# Patient Record
Sex: FEMALE | Race: WHITE | URBAN - METROPOLITAN AREA
[De-identification: names, ages, dates, MRNs, and addresses within clinical notes are randomized per-mention and may not be internally consistent; named-entity substitution may affect disease eponyms.]

---

## 2018-05-04 ENCOUNTER — HOSPITAL ENCOUNTER (OUTPATIENT)
Age: 83
Discharge: HOME | End: 2018-05-06
Payer: MEDICARE

## 2018-05-04 ENCOUNTER — OFFICE VISIT (OUTPATIENT)
Dept: SURGERY | Age: 83
End: 2018-05-04
Payer: MEDICARE

## 2018-05-04 DIAGNOSIS — L98.9 SKIN LESION: Primary | ICD-10-CM

## 2018-05-04 PROCEDURE — 11100 PR OFFICE/OUTPT VISIT,PROCEDURE ONLY: CPT | Performed by: PHYSICIAN ASSISTANT

## 2018-05-04 PROCEDURE — 99204 OFFICE O/P NEW MOD 45 MIN: CPT | Performed by: PHYSICIAN ASSISTANT

## 2018-05-04 PROCEDURE — 88305 TISSUE EXAM BY PATHOLOGIST: CPT

## 2018-05-04 RX ORDER — LIDOCAINE HYDROCHLORIDE AND EPINEPHRINE 10; 10 MG/ML; UG/ML
3 INJECTION, SOLUTION INFILTRATION; PERINEURAL ONCE
Status: COMPLETED | OUTPATIENT
Start: 2018-05-04 | End: 2018-05-04

## 2018-05-04 RX ADMIN — LIDOCAINE HYDROCHLORIDE AND EPINEPHRINE 3 ML: 10; 10 INJECTION, SOLUTION INFILTRATION; PERINEURAL at 15:08

## 2024-01-08 ENCOUNTER — APPOINTMENT (OUTPATIENT)
Dept: GENERAL RADIOLOGY | Age: 89
End: 2024-01-08
Payer: MEDICARE

## 2024-01-08 ENCOUNTER — APPOINTMENT (OUTPATIENT)
Dept: CT IMAGING | Age: 89
End: 2024-01-08
Payer: MEDICARE

## 2024-01-08 ENCOUNTER — HOSPITAL ENCOUNTER (INPATIENT)
Age: 89
LOS: 4 days | Discharge: OTHER INSTITUTION | End: 2024-01-12
Attending: EMERGENCY MEDICINE | Admitting: INTERNAL MEDICINE
Payer: MEDICARE

## 2024-01-08 DIAGNOSIS — B34.2 CORONAVIRUS INFECTION, UNSPECIFIED: ICD-10-CM

## 2024-01-08 DIAGNOSIS — I71.21 ANEURYSM OF ASCENDING AORTA WITHOUT RUPTURE (HCC): ICD-10-CM

## 2024-01-08 DIAGNOSIS — R53.1 GENERALIZED WEAKNESS: Primary | ICD-10-CM

## 2024-01-08 DIAGNOSIS — J18.9 PNEUMONIA OF BOTH LOWER LOBES DUE TO INFECTIOUS ORGANISM: ICD-10-CM

## 2024-01-08 PROBLEM — R53.83 LETHARGIC: Status: ACTIVE | Noted: 2024-01-08

## 2024-01-08 PROBLEM — J00 ACUTE NASOPHARYNGITIS: Status: ACTIVE | Noted: 2024-01-08

## 2024-01-08 PROBLEM — I31.39 PERICARDIAL EFFUSION: Status: ACTIVE | Noted: 2024-01-08

## 2024-01-08 LAB
ALBUMIN SERPL-MCNC: 3.4 G/DL (ref 3.5–5.2)
ALP SERPL-CCNC: 122 U/L (ref 35–104)
ALT SERPL-CCNC: 53 U/L (ref 0–32)
ANION GAP SERPL CALCULATED.3IONS-SCNC: 16 MMOL/L (ref 7–16)
AST SERPL-CCNC: 51 U/L (ref 0–31)
B PARAP IS1001 DNA NPH QL NAA+NON-PROBE: NOT DETECTED
B PERT DNA SPEC QL NAA+PROBE: NOT DETECTED
BACTERIA URNS QL MICRO: ABNORMAL
BASOPHILS # BLD: 0.03 K/UL (ref 0–0.2)
BASOPHILS NFR BLD: 0 % (ref 0–2)
BILIRUB SERPL-MCNC: 0.7 MG/DL (ref 0–1.2)
BILIRUB UR QL STRIP: NEGATIVE
BNP SERPL-MCNC: 3164 PG/ML (ref 0–450)
BUN SERPL-MCNC: 29 MG/DL (ref 6–23)
C PNEUM DNA NPH QL NAA+NON-PROBE: NOT DETECTED
CALCIUM SERPL-MCNC: 9.4 MG/DL (ref 8.6–10.2)
CHLORIDE SERPL-SCNC: 93 MMOL/L (ref 98–107)
CLARITY UR: CLEAR
CO2 SERPL-SCNC: 20 MMOL/L (ref 22–29)
COLOR UR: YELLOW
CREAT SERPL-MCNC: 1 MG/DL (ref 0.5–1)
EOSINOPHIL # BLD: 0 K/UL (ref 0.05–0.5)
EOSINOPHILS RELATIVE PERCENT: 0 % (ref 0–6)
ERYTHROCYTE [DISTWIDTH] IN BLOOD BY AUTOMATED COUNT: 12.5 % (ref 11.5–15)
FLUAV RNA NPH QL NAA+NON-PROBE: NOT DETECTED
FLUBV RNA NPH QL NAA+NON-PROBE: NOT DETECTED
GFR SERPL CREATININE-BSD FRML MDRD: 55 ML/MIN/1.73M2
GLUCOSE SERPL-MCNC: 232 MG/DL (ref 74–99)
GLUCOSE UR STRIP-MCNC: 100 MG/DL
HADV DNA NPH QL NAA+NON-PROBE: NOT DETECTED
HCOV 229E RNA NPH QL NAA+NON-PROBE: NOT DETECTED
HCOV HKU1 RNA NPH QL NAA+NON-PROBE: NOT DETECTED
HCOV NL63 RNA NPH QL NAA+NON-PROBE: NOT DETECTED
HCOV OC43 RNA NPH QL NAA+NON-PROBE: DETECTED
HCT VFR BLD AUTO: 34 % (ref 34–48)
HGB BLD-MCNC: 11.4 G/DL (ref 11.5–15.5)
HGB UR QL STRIP.AUTO: ABNORMAL
HMPV RNA NPH QL NAA+NON-PROBE: NOT DETECTED
HPIV1 RNA NPH QL NAA+NON-PROBE: NOT DETECTED
HPIV2 RNA NPH QL NAA+NON-PROBE: NOT DETECTED
HPIV3 RNA NPH QL NAA+NON-PROBE: NOT DETECTED
HPIV4 RNA NPH QL NAA+NON-PROBE: NOT DETECTED
IMM GRANULOCYTES # BLD AUTO: 0.2 K/UL (ref 0–0.58)
IMM GRANULOCYTES NFR BLD: 1 % (ref 0–5)
INFLUENZA A BY PCR: NOT DETECTED
INFLUENZA B BY PCR: NOT DETECTED
KETONES UR STRIP-MCNC: ABNORMAL MG/DL
LACTATE BLDV-SCNC: 2.7 MMOL/L (ref 0.5–1.9)
LEUKOCYTE ESTERASE UR QL STRIP: NEGATIVE
LIPASE SERPL-CCNC: 41 U/L (ref 13–60)
LYMPHOCYTES NFR BLD: 3.19 K/UL (ref 1.5–4)
LYMPHOCYTES RELATIVE PERCENT: 19 % (ref 20–42)
M PNEUMO DNA NPH QL NAA+NON-PROBE: NOT DETECTED
MCH RBC QN AUTO: 31.3 PG (ref 26–35)
MCHC RBC AUTO-ENTMCNC: 33.5 G/DL (ref 32–34.5)
MCV RBC AUTO: 93.4 FL (ref 80–99.9)
MONOCYTES NFR BLD: 1.04 K/UL (ref 0.1–0.95)
MONOCYTES NFR BLD: 6 % (ref 2–12)
NEUTROPHILS NFR BLD: 74 % (ref 43–80)
NEUTS SEG NFR BLD: 12.47 K/UL (ref 1.8–7.3)
NITRITE UR QL STRIP: NEGATIVE
PH UR STRIP: 5.5 [PH] (ref 5–9)
PLATELET # BLD AUTO: 207 K/UL (ref 130–450)
PMV BLD AUTO: 9.2 FL (ref 7–12)
POTASSIUM SERPL-SCNC: 3.9 MMOL/L (ref 3.5–5)
PROT SERPL-MCNC: 6.9 G/DL (ref 6.4–8.3)
PROT UR STRIP-MCNC: 100 MG/DL
RBC # BLD AUTO: 3.64 M/UL (ref 3.5–5.5)
RBC # BLD: ABNORMAL 10*6/UL
RBC #/AREA URNS HPF: ABNORMAL /HPF
RSV BY PCR: NOT DETECTED
RSV RNA NPH QL NAA+NON-PROBE: NOT DETECTED
RV+EV RNA NPH QL NAA+NON-PROBE: NOT DETECTED
SARS-COV-2 RDRP RESP QL NAA+PROBE: NOT DETECTED
SARS-COV-2 RNA NPH QL NAA+NON-PROBE: NOT DETECTED
SODIUM SERPL-SCNC: 129 MMOL/L (ref 132–146)
SP GR UR STRIP: 1.02 (ref 1–1.03)
SPECIMEN DESCRIPTION: ABNORMAL
SPECIMEN DESCRIPTION: NORMAL
SPECIMEN SOURCE: NORMAL
TROPONIN I SERPL HS-MCNC: 103 NG/L (ref 0–9)
TROPONIN I SERPL HS-MCNC: 94 NG/L (ref 0–9)
UROBILINOGEN UR STRIP-ACNC: 1 EU/DL (ref 0–1)
WBC #/AREA URNS HPF: ABNORMAL /HPF
WBC OTHER # BLD: 16.9 K/UL (ref 4.5–11.5)

## 2024-01-08 PROCEDURE — 6360000002 HC RX W HCPCS: Performed by: EMERGENCY MEDICINE

## 2024-01-08 PROCEDURE — 87634 RSV DNA/RNA AMP PROBE: CPT

## 2024-01-08 PROCEDURE — 2580000003 HC RX 258: Performed by: EMERGENCY MEDICINE

## 2024-01-08 PROCEDURE — 99223 1ST HOSP IP/OBS HIGH 75: CPT | Performed by: INTERNAL MEDICINE

## 2024-01-08 PROCEDURE — 72170 X-RAY EXAM OF PELVIS: CPT

## 2024-01-08 PROCEDURE — 0202U NFCT DS 22 TRGT SARS-COV-2: CPT

## 2024-01-08 PROCEDURE — 87154 CUL TYP ID BLD PTHGN 6+ TRGT: CPT

## 2024-01-08 PROCEDURE — 2500000003 HC RX 250 WO HCPCS: Performed by: EMERGENCY MEDICINE

## 2024-01-08 PROCEDURE — 83605 ASSAY OF LACTIC ACID: CPT

## 2024-01-08 PROCEDURE — 83880 ASSAY OF NATRIURETIC PEPTIDE: CPT

## 2024-01-08 PROCEDURE — 81001 URINALYSIS AUTO W/SCOPE: CPT

## 2024-01-08 PROCEDURE — 99285 EMERGENCY DEPT VISIT HI MDM: CPT

## 2024-01-08 PROCEDURE — 74174 CTA ABD&PLVS W/CONTRAST: CPT

## 2024-01-08 PROCEDURE — 87040 BLOOD CULTURE FOR BACTERIA: CPT

## 2024-01-08 PROCEDURE — 71045 X-RAY EXAM CHEST 1 VIEW: CPT

## 2024-01-08 PROCEDURE — 84484 ASSAY OF TROPONIN QUANT: CPT

## 2024-01-08 PROCEDURE — 71250 CT THORAX DX C-: CPT

## 2024-01-08 PROCEDURE — 72125 CT NECK SPINE W/O DYE: CPT

## 2024-01-08 PROCEDURE — 96365 THER/PROPH/DIAG IV INF INIT: CPT

## 2024-01-08 PROCEDURE — 93005 ELECTROCARDIOGRAM TRACING: CPT | Performed by: PHYSICIAN ASSISTANT

## 2024-01-08 PROCEDURE — 83690 ASSAY OF LIPASE: CPT

## 2024-01-08 PROCEDURE — 2580000003 HC RX 258

## 2024-01-08 PROCEDURE — 85025 COMPLETE CBC W/AUTO DIFF WBC: CPT

## 2024-01-08 PROCEDURE — 96374 THER/PROPH/DIAG INJ IV PUSH: CPT

## 2024-01-08 PROCEDURE — 6360000004 HC RX CONTRAST MEDICATION: Performed by: RADIOLOGY

## 2024-01-08 PROCEDURE — 87635 SARS-COV-2 COVID-19 AMP PRB: CPT

## 2024-01-08 PROCEDURE — 70450 CT HEAD/BRAIN W/O DYE: CPT

## 2024-01-08 PROCEDURE — 71275 CT ANGIOGRAPHY CHEST: CPT

## 2024-01-08 PROCEDURE — 87502 INFLUENZA DNA AMP PROBE: CPT

## 2024-01-08 PROCEDURE — 87086 URINE CULTURE/COLONY COUNT: CPT

## 2024-01-08 PROCEDURE — 80053 COMPREHEN METABOLIC PANEL: CPT

## 2024-01-08 PROCEDURE — 6370000000 HC RX 637 (ALT 250 FOR IP): Performed by: EMERGENCY MEDICINE

## 2024-01-08 PROCEDURE — 2060000000 HC ICU INTERMEDIATE R&B

## 2024-01-08 RX ORDER — 0.9 % SODIUM CHLORIDE 0.9 %
500 INTRAVENOUS SOLUTION INTRAVENOUS ONCE
Status: COMPLETED | OUTPATIENT
Start: 2024-01-09 | End: 2024-01-09

## 2024-01-08 RX ORDER — ACETAMINOPHEN 500 MG
500 TABLET ORAL EVERY 6 HOURS PRN
Status: DISCONTINUED | OUTPATIENT
Start: 2024-01-08 | End: 2024-01-11 | Stop reason: ALTCHOICE

## 2024-01-08 RX ORDER — ACETAMINOPHEN 650 MG/1
650 SUPPOSITORY RECTAL ONCE
Status: COMPLETED | OUTPATIENT
Start: 2024-01-08 | End: 2024-01-08

## 2024-01-08 RX ORDER — 0.9 % SODIUM CHLORIDE 0.9 %
500 INTRAVENOUS SOLUTION INTRAVENOUS ONCE
Status: COMPLETED | OUTPATIENT
Start: 2024-01-08 | End: 2024-01-08

## 2024-01-08 RX ORDER — ACETAMINOPHEN 325 MG/1
650 TABLET ORAL ONCE
Status: DISCONTINUED | OUTPATIENT
Start: 2024-01-08 | End: 2024-01-08

## 2024-01-08 RX ORDER — ACETAMINOPHEN 650 MG/1
SUPPOSITORY RECTAL
Status: DISPENSED
Start: 2024-01-08 | End: 2024-01-09

## 2024-01-08 RX ORDER — SODIUM CHLORIDE 0.9 % (FLUSH) 0.9 %
SYRINGE (ML) INJECTION
Status: COMPLETED
Start: 2024-01-08 | End: 2024-01-08

## 2024-01-08 RX ORDER — 0.9 % SODIUM CHLORIDE 0.9 %
1000 INTRAVENOUS SOLUTION INTRAVENOUS ONCE
Status: COMPLETED | OUTPATIENT
Start: 2024-01-08 | End: 2024-01-08

## 2024-01-08 RX ADMIN — SODIUM CHLORIDE 500 ML: 9 INJECTION, SOLUTION INTRAVENOUS at 18:26

## 2024-01-08 RX ADMIN — SODIUM CHLORIDE 1000 ML: 9 INJECTION, SOLUTION INTRAVENOUS at 13:01

## 2024-01-08 RX ADMIN — SODIUM CHLORIDE, PRESERVATIVE FREE 10 ML: 5 INJECTION INTRAVENOUS at 13:01

## 2024-01-08 RX ADMIN — DOXYCYCLINE 100 MG: 100 INJECTION, POWDER, LYOPHILIZED, FOR SOLUTION INTRAVENOUS at 15:14

## 2024-01-08 RX ADMIN — WATER 1000 MG: 1 INJECTION INTRAMUSCULAR; INTRAVENOUS; SUBCUTANEOUS at 14:57

## 2024-01-08 RX ADMIN — IOPAMIDOL 75 ML: 755 INJECTION, SOLUTION INTRAVENOUS at 16:18

## 2024-01-08 RX ADMIN — ACETAMINOPHEN 650 MG: 650 SUPPOSITORY RECTAL at 14:57

## 2024-01-08 ASSESSMENT — PAIN SCALES - GENERAL
PAINLEVEL_OUTOF10: 7
PAINLEVEL_OUTOF10: 5

## 2024-01-08 ASSESSMENT — LIFESTYLE VARIABLES
HOW OFTEN DO YOU HAVE A DRINK CONTAINING ALCOHOL: NEVER
HOW MANY STANDARD DRINKS CONTAINING ALCOHOL DO YOU HAVE ON A TYPICAL DAY: PATIENT DOES NOT DRINK

## 2024-01-08 ASSESSMENT — PAIN - FUNCTIONAL ASSESSMENT: PAIN_FUNCTIONAL_ASSESSMENT: NONE - DENIES PAIN

## 2024-01-08 NOTE — ED TRIAGE NOTES
Department of Emergency Medicine    FIRST PROVIDER TRIAGE NOTE             Independent MLP           1/8/24  10:50 AM EST    Date of Encounter: 1/8/24   MRN: [id number]    Vitals:    01/08/24 1104   BP: 120/67   Pulse: (!) 124   Resp: 21   Temp: 100.4 °F (38 °C)   TempSrc: Oral   SpO2: 96%   Weight: 54.4 kg (120 lb)   Height: 1.524 m (5')      HPI: Rosa Isela Weaver is a 89 y.o. female who presents to the ED for Altered Mental Status (Began today upon waking. ) and Fatigue (Pretty fatigued for the past couple days. Normally very active, lives alone. )     ROS: Negative for urinary complaints.    Physical Exam:   Gen Appearance/Constitutional: alert, lethargic   CV: tachycardia     Initial Plan of Care: All treatment areas with department are currently occupied.     Plan to order/Initiate the following while awaiting opening in ED: Triage evaluation  EKG and imaging studies.    Initial Plan of Care: Initiate Treatment-Testing, Proceed toTreatment Area When Bed Available for ED Attending/MLP to Continue Care    Electronically signed by Sakina Vazquez PA-C   DD: 1/8/24

## 2024-01-08 NOTE — PROGRESS NOTES
Database initiated. Patient is alert to self only. (Usually AX4) comes in from home alone. She uses a walker and is RA at baseline. She was unable to ambulate today. Hard of hearing even with hearing aides.

## 2024-01-08 NOTE — ED PROVIDER NOTES
[facility] EMERGENCY DEPARTMENT  EMERGENCY DEPARTMENT ENCOUNTER        Pt Name: Rosa Isela Weaver  MRN: [id number]  Birthdate 12/16/1933  Date of evaluation: 1/8/2024  Provider: Kimberly Dover MD  PCP: Alber Leong MD  Note Started: 11:25 AM EST 1/8/24    CHIEF COMPLAINT       Chief Complaint   Patient presents with    Altered Mental Status     Began today upon waking.     Fatigue     Pretty fatigued for the past couple days. Normally very active, lives alone.        HISTORY OF PRESENT ILLNESS: 1 or more Elements   History From:  daughter    Limitations to history : Mild confusion and very hard of hearing without working hearing aids    Rosa Isela Weaver is a 89 y.o. female who presents to department with her daughter.  Daughter is staying with her for the past few days as patient been feeling more weak generalized fatigue.  No known fever at home but low-grade fever here.  No cough or congestion no vomiting no diarrhea.  She usually walks with a is been now been using a walker.  She did fall getting out of bed yesterday not on any blood thinners.  Unknown if she hit her head.  Daughter thought she seemed slightly more confused today and too weak to get out of bed.  Patient lives alone but daughter has been staying with her. Her hearing aids are not working so she is extremely hard of hearing.  She states that she has no pain at this time she is pleasant no acute distress.  Alert and oriented to person.  Difficult to determine if she is confused regarding place and time or just extremely hard of hearing.  No distress.    Nursing Notes were all reviewed and agreed with or any disagreements were addressed in the HPI.      REVIEW OF EXTERNAL NOTE :       PDMP reviewed    REVIEW OF SYSTEMS :           Positives and Pertinent negatives as per HPI.     SURGICAL HISTORY   No past surgical history on file.    CURRENTMEDICATIONS       Previous Medications    No medications on

## 2024-01-09 LAB
EKG ATRIAL RATE: 111 BPM
EKG P AXIS: 17 DEGREES
EKG P-R INTERVAL: 174 MS
EKG Q-T INTERVAL: 334 MS
EKG QRS DURATION: 114 MS
EKG QTC CALCULATION (BAZETT): 454 MS
EKG R AXIS: -39 DEGREES
EKG T AXIS: 21 DEGREES
EKG VENTRICULAR RATE: 111 BPM
ERYTHROCYTE [SEDIMENTATION RATE] IN BLOOD BY WESTERGREN METHOD: 90 MM/HR (ref 0–20)
LACTATE BLDV-SCNC: 2.4 MMOL/L (ref 0.5–2.2)
MICROORGANISM SPEC CULT: NO GROWTH
SPECIMEN DESCRIPTION: NORMAL

## 2024-01-09 PROCEDURE — 2500000003 HC RX 250 WO HCPCS: Performed by: INTERNAL MEDICINE

## 2024-01-09 PROCEDURE — 83605 ASSAY OF LACTIC ACID: CPT

## 2024-01-09 PROCEDURE — 83036 HEMOGLOBIN GLYCOSYLATED A1C: CPT

## 2024-01-09 PROCEDURE — 99232 SBSQ HOSP IP/OBS MODERATE 35: CPT | Performed by: INTERNAL MEDICINE

## 2024-01-09 PROCEDURE — 6360000002 HC RX W HCPCS: Performed by: INTERNAL MEDICINE

## 2024-01-09 PROCEDURE — 93010 ELECTROCARDIOGRAM REPORT: CPT | Performed by: INTERNAL MEDICINE

## 2024-01-09 PROCEDURE — 6360000002 HC RX W HCPCS: Performed by: SPECIALIST

## 2024-01-09 PROCEDURE — 51798 US URINE CAPACITY MEASURE: CPT

## 2024-01-09 PROCEDURE — 87899 AGENT NOS ASSAY W/OPTIC: CPT

## 2024-01-09 PROCEDURE — 6370000000 HC RX 637 (ALT 250 FOR IP): Performed by: INTERNAL MEDICINE

## 2024-01-09 PROCEDURE — 85652 RBC SED RATE AUTOMATED: CPT

## 2024-01-09 PROCEDURE — 2060000000 HC ICU INTERMEDIATE R&B

## 2024-01-09 PROCEDURE — 36415 COLL VENOUS BLD VENIPUNCTURE: CPT

## 2024-01-09 PROCEDURE — 2580000003 HC RX 258: Performed by: SPECIALIST

## 2024-01-09 PROCEDURE — 87040 BLOOD CULTURE FOR BACTERIA: CPT

## 2024-01-09 PROCEDURE — 2580000003 HC RX 258: Performed by: INTERNAL MEDICINE

## 2024-01-09 RX ORDER — ONDANSETRON 2 MG/ML
4 INJECTION INTRAMUSCULAR; INTRAVENOUS EVERY 6 HOURS PRN
Status: DISCONTINUED | OUTPATIENT
Start: 2024-01-09 | End: 2024-01-12 | Stop reason: HOSPADM

## 2024-01-09 RX ORDER — ACETAMINOPHEN 325 MG/1
650 TABLET ORAL EVERY 6 HOURS PRN
Status: DISCONTINUED | OUTPATIENT
Start: 2024-01-09 | End: 2024-01-12 | Stop reason: HOSPADM

## 2024-01-09 RX ORDER — ASCORBIC ACID 1000 MG
1 TABLET ORAL EVERY MORNING
COMMUNITY

## 2024-01-09 RX ORDER — ENOXAPARIN SODIUM 100 MG/ML
30 INJECTION SUBCUTANEOUS DAILY
Status: DISCONTINUED | OUTPATIENT
Start: 2024-01-09 | End: 2024-01-12

## 2024-01-09 RX ORDER — ONDANSETRON 4 MG/1
4 TABLET, ORALLY DISINTEGRATING ORAL EVERY 8 HOURS PRN
Status: DISCONTINUED | OUTPATIENT
Start: 2024-01-09 | End: 2024-01-12 | Stop reason: HOSPADM

## 2024-01-09 RX ORDER — SODIUM CHLORIDE 0.9 % (FLUSH) 0.9 %
5-40 SYRINGE (ML) INJECTION EVERY 12 HOURS SCHEDULED
Status: DISCONTINUED | OUTPATIENT
Start: 2024-01-09 | End: 2024-01-12 | Stop reason: HOSPADM

## 2024-01-09 RX ORDER — 0.9 % SODIUM CHLORIDE 0.9 %
500 INTRAVENOUS SOLUTION INTRAVENOUS ONCE
Status: COMPLETED | OUTPATIENT
Start: 2024-01-09 | End: 2024-01-09

## 2024-01-09 RX ORDER — POLYETHYLENE GLYCOL 3350 17 G/17G
17 POWDER, FOR SOLUTION ORAL DAILY PRN
Status: DISCONTINUED | OUTPATIENT
Start: 2024-01-09 | End: 2024-01-12 | Stop reason: HOSPADM

## 2024-01-09 RX ORDER — SODIUM CHLORIDE 0.9 % (FLUSH) 0.9 %
5-40 SYRINGE (ML) INJECTION PRN
Status: DISCONTINUED | OUTPATIENT
Start: 2024-01-09 | End: 2024-01-12 | Stop reason: HOSPADM

## 2024-01-09 RX ORDER — SODIUM CHLORIDE 9 MG/ML
INJECTION, SOLUTION INTRAVENOUS PRN
Status: DISCONTINUED | OUTPATIENT
Start: 2024-01-09 | End: 2024-01-12 | Stop reason: HOSPADM

## 2024-01-09 RX ORDER — ACETAMINOPHEN 650 MG/1
650 SUPPOSITORY RECTAL EVERY 6 HOURS PRN
Status: DISCONTINUED | OUTPATIENT
Start: 2024-01-09 | End: 2024-01-12 | Stop reason: HOSPADM

## 2024-01-09 RX ADMIN — SODIUM CHLORIDE 500 ML: 9 INJECTION, SOLUTION INTRAVENOUS at 08:10

## 2024-01-09 RX ADMIN — WATER 2000 MG: 1 INJECTION INTRAMUSCULAR; INTRAVENOUS; SUBCUTANEOUS at 11:36

## 2024-01-09 RX ADMIN — ENOXAPARIN SODIUM 30 MG: 100 INJECTION SUBCUTANEOUS at 17:20

## 2024-01-09 RX ADMIN — DOXYCYCLINE 100 MG: 100 INJECTION, POWDER, LYOPHILIZED, FOR SOLUTION INTRAVENOUS at 03:21

## 2024-01-09 RX ADMIN — ACETAMINOPHEN 650 MG: 650 SUPPOSITORY RECTAL at 21:19

## 2024-01-09 RX ADMIN — SODIUM CHLORIDE 500 ML: 9 INJECTION, SOLUTION INTRAVENOUS at 11:48

## 2024-01-09 RX ADMIN — Medication 10 ML: at 21:02

## 2024-01-09 NOTE — CONSULTS
[facility]  [facility]  Consultation Note     Admit Date: 1/8/2024 11:15 AM    Reason for Consult:   Positive blood cultures    Attending Physician:  Neda Jack MD    HISTORY OF PRESENT ILLNESS:             The history is obtained from extensive review of available past medical records and daughters. The patient is a 89 y.o. female who is unknown to the ID service.  The patient was brought into the ED at [facility] on 1/8/2023 with fatigue and altered mentation.  She had a fall and out of bed trying to get out of bed the day before.  No known trauma.  On presentation she had a low-grade fever and later had a Tmax of 102.5 °F.  She was tachycardic and tachypneic.  White count was 16.9.  Transaminases were elevated with an AST of 51 and ALT of 53.  Alkaline phosphatase was 122.  BUN was slightly elevated and creatinine was normal.  Lactic acid was 2.7.  CT angiogram showed small bilateral lower lobe infiltrates.  CT of the abdomen and pelvis was unremarkable.  Respiratory panel was positive for coronavirus OC 43.  Rapid SARS-CoV-2, rapid influenza and RSV were negative.  Blood cultures have turned positive for Streptococcus pneumoniae in 4 bottles.  She was treated with Ceftriaxone and Doxycycline.  Daughters say that she hardly ever went to see doctors and in general has declined vaccinations.    Past Medical History:    No past medical history on file.  Past Surgical History:    No past surgical history on file.  Current Medications:   Scheduled Meds:   cefTRIAXone (ROCEPHIN) IV  2,000 mg IntraVENous Once     Continuous Infusions:  PRN Meds:acetaminophen    Allergies:  Bactrim [sulfamethoxazole-trimethoprim]    Social History:   Social History     Socioeconomic History    Marital status:      Social Determinants of Health     Food Insecurity: No Food Insecurity (1/8/2024)    Hunger Vital Sign     Worried About Running Out of Food in the Last Year: Never true     Ran  Out of Food in the Last Year: Never true   Transportation Needs: No Transportation Needs (1/8/2024)    PRAPARE - Transportation     Lack of Transportation (Medical): No     Lack of Transportation (Non-Medical): No   Housing Stability: Low Risk  (1/8/2024)    Housing Stability Vital Sign     Unable to Pay for Housing in the Last Year: No     Number of Places Lived in the Last Year: 1     Unstable Housing in the Last Year: No      Pets: None  Travel: No  The patient lives alone.  She did work in a chocolate factory    Family History:   No family history on file.. Otherwise non-pertinent to the chief complaint.    REVIEW OF SYSTEMS:    A 10 point review of systems could not be obtained from the patient given her status of confusion.  Otherwise, as in the HPI    PHYSICAL EXAM:    Vitals:   BP (!) 149/90   Pulse 81   Temp 97.8 °F (36.6 °C) (Oral)   Resp 22   Ht 1.524 m (5')   Wt 54.4 kg (120 lb)   SpO2 96%   BMI 23.44 kg/m²   Constitutional: The patient is lying on a gurney in the ED.  She is lethargic.  She is arousable but barely answers questions or follows commands.  She is hard of hearing.  2 daughters in room.  Skin: Warm and dry. No rashes were noted.   HEENT: Eyes show round, and reactive pupils. No jaundice. Moist mucous membranes, no ulcerations, no thrush.   Neck: Supple to movements. No lymphadenopathy.   Chest: No use of accessory muscles to breathe. Symmetrical expansion. Auscultation reveals no wheezing, crackles, or rhonchi.   Cardiovascular: S1 and S2 are rhythmic and regular. No murmurs appreciated.   Abdomen: Positive bowel sounds to auscultation. Benign to palpation. No masses felt. No hepatosplenomegaly.  Extremities: No clubbing, no cyanosis.  Minimal edema.  Lines: Peripheral.    No results found for: \"CRP\"   No results found for: \"CRPHS\"  No results found for: \"SEDRATE\"    Radiology:      Microbiology:  Respiratory panel: Coronavirus OC43  Blood cultures 1/8/2024: Streptococcus pneumoniae

## 2024-01-09 NOTE — CONSULTS
Marco Antonio Ramsey M.D.,[facility]  Conor Joel D.O., LÓPEZ., [facility]  Adriane Sue M.D.  Porsha Buckley M.D.   Enmanuel Major D.O.      Patient:  Rosa Isela Weaver 89 y.o. female MRN: [id number]     Date of Service: 1/9/2024      PULMONARY CONSULTATION    Reason for Consultation:   Referring Physician: Strep pneumonia positive corona/not COVID    Communication with the referring physician will be sent via the electronic medical record.    Chief Complaint: Altered mental status, fatigue    CODE STATUS: Full code    SUBJECTIVE:  HPI:  Rosa Isela Weaver is a 89 y.o. female not previously known to us with no significant past medical history who was brought to the hospital by her daughter for weakness, fatigue and lethargy ongoing for 3 to 4 days.  At baseline the patient is very active and lives alone.  Workup revealed that she is positive for the coronavirus OC 43 and she is also bacteremic with streptococcal pneumonia.  She also has leukocytosis with a white count of 16.9.    Rosa Isela was seen today with her daughter present at the bedside.  Rosa Isela is normally very very hard of hearing even with hearing aids in therefore it is unclear if she is currently confused or if she just cannot hear anything therefore she appears confused.  Lungs are clear on exam.  She is pretty restless and moaning.      No past medical history on file.    No past surgical history on file.    No family history on file.    Social History:   Social History     Socioeconomic History    Marital status:      Spouse name: Not on file    Number of children: Not on file    Years of education: Not on file    Highest education level: Not on file   Occupational History    Not on file   Tobacco Use    Smoking status: Not on file    Smokeless tobacco: Not on file   Substance and Sexual Activity    Alcohol use: Not on file    Drug use: Not on file    Sexual activity: Not on file   Other Topics Concern    Not on file   Social History Narrative

## 2024-01-09 NOTE — PROGRESS NOTES
Full nurse to nurse report called to [facility] for room 20 going to room 408. All questions all concerns addressed.

## 2024-01-09 NOTE — PROGRESS NOTES
Call to Dr Jack, reported blood cultures positive result and reported labs from 1/8/24 upon draw per chart.Reviewed with family and updated.

## 2024-01-09 NOTE — PLAN OF CARE
Working on fu notes from last night  I see strep pneumonia +blood  Likely true +strep after   +corona virus/not covid infection    As putting in order for rocephin I see Dr Nicole also putting in order  His says 2grams ?loading-  I put in maintenance Q`24 hours  ?1-2grams IVQ24 hours-     I see now per his note ID noted  Now 2grams daily rocephin    Doxycycline stopped  Will defer abx to ID  Repeat blood cx ordered  I also ordered urine antigen strep  Pulm consult?-

## 2024-01-09 NOTE — PROGRESS NOTES
Consulted to collect labs/blood cultures.  Was able to collect blood in blood cultures but very minimal amount as pt has poor access.

## 2024-01-09 NOTE — PROGRESS NOTES
[facility] Hospitalist Progress Note    Admitting Date and Time: 1/8/2024 11:15 AM  Admit Dx: Generalized weakness [R53.1]  Coronavirus infection [B34.2]  Pneumonia of both lower lobes due to infectious organism [J18.9]  Coronavirus infection, unspecified [B34.2]  Aneurysm of ascending aorta without rupture (HCC) [I71.21]    Subjective:  Patient is being followed for Generalized weakness [R53.1]  Coronavirus infection [B34.2]  Pneumonia of both lower lobes due to infectious organism [J18.9]  Coronavirus infection, unspecified [B34.2]  Aneurysm of ascending aorta without rupture (HCC) [I71.21]   Pt seen and examined this morning  No acute events overnight  Sitting up in bed in no acute distress  Very hard of hearing unable to complete ROS    ROS: Unable to assess     [START ON 1/10/2024] cefTRIAXone (ROCEPHIN) IV  2,000 mg IntraVENous Q24H     acetaminophen, 500 mg, Q6H PRN         Objective:    /82   Pulse (!) 101   Temp 98.7 °F (37.1 °C) (Oral)   Resp 22   Ht 1.524 m (5')   Wt 54.4 kg (120 lb)   SpO2 95%   BMI 23.44 kg/m²     General Appearance: alert and awake, in no acute distress  Skin: warm and dry  Head: normocephalic and atraumatic  Eyes: pupils equal, round, and reactive to light, extraocular eye movements intact, conjunctivae normal  Neck: neck supple and non tender without mass   Pulmonary/Chest: clear to auscultation bilaterally- no wheezes, rales or rhonchi, normal air movement, no respiratory distress  Cardiovascular: normal rate, normal S1 and S2 and no carotid bruits  Abdomen: soft, non-tender, non-distended, normal bowel sounds, no masses or organomegaly  Extremities: no cyanosis, no clubbing and no edema  Neurologic: Unable to assess        Recent Labs     01/08/24  1234   *   K 3.9   CL 93*   CO2 20*   BUN 29*   CREATININE 1.0   GLUCOSE 232*   CALCIUM 9.4       Recent Labs     01/08/24  1234   WBC 16.9*   RBC 3.64   HGB 11.4*   HCT 34.0   MCV 93.4   MCH 31.3   MCHC 33.5    RDW 12.5      MPV 9.2       Assessment and Plan:     Principal Problem:    Coronavirus infection  Active Problems:    Acute nasopharyngitis    Pericardial effusion    Lethargic  Resolved Problems:    * No resolved hospital problems. *    Coronavirus infection with superimposed bacterial pneumonia.  Strep pneumo.  Continue Rocephin.  Supportive care, breathing treatments, oxygen as needed.  Strep pneumo bacteremia.  Likely 2/2 the above.  ID following.  Continue Rocephin 2 g.  Repeat blood cultures  Acute metabolic encephalopathy.  Likely 2/2 the above.  Continue antibiotics and monitor mentation.  Patient is very hard of hearing and hearing aids do not work so unable to fully assess mentation/orientation  Lactic acidosis.  Likely 2/2 the above.  Received 2 L IVF in the ED.  Follow repeat  Generalized weakness and fatigue.  Likely 2/2 the above.  Continue supportive care.  PT OT  Leukocytosis.  Continue antibiotics.  Monitor CBC  Elevated proBNP.  3,164.  CXR with vascular congestion.  Diuresis after lactic acidosis is resolved and hemodynamically stable. ?  Underlying CHF.    Will need to discuss CODE STATUS    Time spent reviewing chart, clinical exam, discussing case and answering questions with staff/consultants/patient/family aprox 35 mins.     NOTE: This report was transcribed using voice recognition software. Every effort was made to ensure accuracy; however, inadvertent computerized transcription errors may be present.  Electronically signed by Neda Jack MD on 1/9/2024 at 3:11 PM

## 2024-01-09 NOTE — H&P
[facility] Hospitalist Group History and Physical        Chief Complaint:  pneumonia  History of Present Illness   The patient is a 89 y.o. female      Daughter brought patient in bc patient has been more weak and lethargic/fatigue over past 3-4 days- per family no fevers/chills, no new cough  ?teresa  +more confused day of admission  Fell at home, getting out fo bed  +[place]- seems confused  Denies acute complaints lying in ER bed  Low grade temps noted in ER, also initialyl sinus tachycardia  WBC 16.9 - suspected infection/sepsis with AMS  \"+LA 2.7  Given IV fluids- which did respond to \"nromalize HR\"  initial temp 100.4  At time of eval in ER in middle of night  I had results viral panel:  In ER started on abx, rocephin and doxy- for now I will continue doxy to cover  \"Walking pneumonia\"/Community acquired pneumonia, check crp/procal  CT chest showed pericardial effusion and some lung ?pneumonia vs atelectasis  \"  Areas of subpleural infiltrates/consolidations in the posteroinferior   aspect of the lower lobes relate with areas of multi peripheral segmental   atelectasis     +Corona virus/not covid-- I dicussed this with daughter     - hx taken from the patient/daughter/records  REVIEW OF SYSTEMS:  no fevers, chills, cp, sob    Past Medical History:  Never goes  to doc  Not on meds  No vaccines in past  ?PCP trice, vs other     Past Surgical History:    No past surgical history on file.    Home Medications:  Prior to Admission medications    Not on File       Allergies:  Bactrim [sulfamethoxazole-trimethoprim]    Social History:   TOBACCO:   has no history on file for tobacco use.  ETOH:   has no history on file for alcohol use.    Family History:   No family history on file.   Or deferred/otherwise considered non contributory to current admission  PHYSICAL EXAM:    VS: BP 96/66   Pulse 90   Temp 100 °F (37.8 °C) (Axillary)   Resp 18   Ht 1.524 m (5')   Wt 54.4 kg (120 lb)   SpO2 99%   BMI 23.44 kg/m²  reactive    Addendum:  Working on fu notes from last night  I see strep pneumonia +blood  Likely true +strep after   +corona virus/not covid infection     As putting in order for rocephin I see Dr Nicole also putting in order  His says 2grams ?loading-  I put in maintenance Q`24 hours  ?1-2grams IVQ24 hours-      I see now per his note ID noted  Now 2grams daily rocephin     Doxycycline stopped  Will defer abx to ID  Repeat blood cx ordered  I also ordered urine antigen strep  Pulm consult?-        Arash Chino MD   Night Officer, overnight admitting doctor at [facility]--please call day time doctor   for questions after 7:30am    Covering for [facility] Hospitalist Service  If Qs please call [phone]  Electronically signed by Arash Chino MD on 1/8/2024 at 8:05 PM

## 2024-01-10 ENCOUNTER — APPOINTMENT (OUTPATIENT)
Dept: GENERAL RADIOLOGY | Age: 89
End: 2024-01-10
Payer: MEDICARE

## 2024-01-10 LAB
ALBUMIN SERPL-MCNC: 2.4 G/DL (ref 3.5–5.2)
ALP SERPL-CCNC: 115 U/L (ref 35–104)
ALT SERPL-CCNC: 34 U/L (ref 0–32)
ANION GAP SERPL CALCULATED.3IONS-SCNC: 13 MMOL/L (ref 7–16)
AST SERPL-CCNC: 29 U/L (ref 0–31)
BASOPHILS # BLD: 0 K/UL (ref 0–0.2)
BASOPHILS NFR BLD: 0 % (ref 0–2)
BILIRUB SERPL-MCNC: 0.4 MG/DL (ref 0–1.2)
BUN SERPL-MCNC: 19 MG/DL (ref 6–23)
CALCIUM SERPL-MCNC: 8.3 MG/DL (ref 8.6–10.2)
CHLORIDE SERPL-SCNC: 100 MMOL/L (ref 98–107)
CO2 SERPL-SCNC: 18 MMOL/L (ref 22–29)
CREAT SERPL-MCNC: 0.6 MG/DL (ref 0.5–1)
EOSINOPHIL # BLD: 0 K/UL (ref 0.05–0.5)
EOSINOPHILS RELATIVE PERCENT: 0 % (ref 0–6)
ERYTHROCYTE [DISTWIDTH] IN BLOOD BY AUTOMATED COUNT: 12.9 % (ref 11.5–15)
GFR SERPL CREATININE-BSD FRML MDRD: >60 ML/MIN/1.73M2
GLUCOSE SERPL-MCNC: 108 MG/DL (ref 74–99)
HBA1C MFR BLD: 6 % (ref 4–5.6)
HCT VFR BLD AUTO: 31.2 % (ref 34–48)
HGB BLD-MCNC: 10.5 G/DL (ref 11.5–15.5)
LACTATE BLDV-SCNC: 1.7 MMOL/L (ref 0.5–2.2)
LYMPHOCYTES NFR BLD: 16.16 K/UL (ref 1.5–4)
LYMPHOCYTES RELATIVE PERCENT: 61 % (ref 20–42)
MCH RBC QN AUTO: 31.5 PG (ref 26–35)
MCHC RBC AUTO-ENTMCNC: 33.7 G/DL (ref 32–34.5)
MCV RBC AUTO: 93.7 FL (ref 80–99.9)
MONOCYTES NFR BLD: 1.41 K/UL (ref 0.1–0.95)
MONOCYTES NFR BLD: 5 % (ref 2–12)
NEUTROPHILS NFR BLD: 34 % (ref 43–80)
NEUTS SEG NFR BLD: 9.13 K/UL (ref 1.8–7.3)
PLATELET # BLD AUTO: 239 K/UL (ref 130–450)
PMV BLD AUTO: 9.1 FL (ref 7–12)
POTASSIUM SERPL-SCNC: 3.6 MMOL/L (ref 3.5–5)
PROT SERPL-MCNC: 6 G/DL (ref 6.4–8.3)
RBC # BLD AUTO: 3.33 M/UL (ref 3.5–5.5)
RBC # BLD: ABNORMAL 10*6/UL
S PNEUM AG SPEC QL: POSITIVE
SODIUM SERPL-SCNC: 131 MMOL/L (ref 132–146)
SPECIMEN SOURCE: ABNORMAL
TROPONIN I SERPL HS-MCNC: 48 NG/L (ref 0–9)
WBC OTHER # BLD: 26.7 K/UL (ref 4.5–11.5)

## 2024-01-10 PROCEDURE — 2060000000 HC ICU INTERMEDIATE R&B

## 2024-01-10 PROCEDURE — 2580000003 HC RX 258: Performed by: INTERNAL MEDICINE

## 2024-01-10 PROCEDURE — 6360000002 HC RX W HCPCS: Performed by: INTERNAL MEDICINE

## 2024-01-10 PROCEDURE — 2700000000 HC OXYGEN THERAPY PER DAY

## 2024-01-10 PROCEDURE — 84484 ASSAY OF TROPONIN QUANT: CPT

## 2024-01-10 PROCEDURE — 73610 X-RAY EXAM OF ANKLE: CPT

## 2024-01-10 PROCEDURE — 83605 ASSAY OF LACTIC ACID: CPT

## 2024-01-10 PROCEDURE — 99232 SBSQ HOSP IP/OBS MODERATE 35: CPT | Performed by: INTERNAL MEDICINE

## 2024-01-10 PROCEDURE — 36415 COLL VENOUS BLD VENIPUNCTURE: CPT

## 2024-01-10 PROCEDURE — 97530 THERAPEUTIC ACTIVITIES: CPT

## 2024-01-10 PROCEDURE — 97161 PT EVAL LOW COMPLEX 20 MIN: CPT

## 2024-01-10 PROCEDURE — 85025 COMPLETE CBC W/AUTO DIFF WBC: CPT

## 2024-01-10 PROCEDURE — 80053 COMPREHEN METABOLIC PANEL: CPT

## 2024-01-10 RX ORDER — SODIUM CHLORIDE 9 MG/ML
INJECTION, SOLUTION INTRAVENOUS CONTINUOUS
Status: DISCONTINUED | OUTPATIENT
Start: 2024-01-10 | End: 2024-01-12 | Stop reason: HOSPADM

## 2024-01-10 RX ADMIN — WATER 2000 MG: 1 INJECTION INTRAMUSCULAR; INTRAVENOUS; SUBCUTANEOUS at 12:02

## 2024-01-10 RX ADMIN — SODIUM CHLORIDE: 9 INJECTION, SOLUTION INTRAVENOUS at 09:32

## 2024-01-10 RX ADMIN — Medication 10 ML: at 09:31

## 2024-01-10 RX ADMIN — Medication 10 ML: at 20:15

## 2024-01-10 RX ADMIN — ENOXAPARIN SODIUM 30 MG: 100 INJECTION SUBCUTANEOUS at 09:31

## 2024-01-10 ASSESSMENT — PAIN SCALES - GENERAL
PAINLEVEL_OUTOF10: 0

## 2024-01-10 NOTE — PLAN OF CARE
Problem: ABCDS Injury Assessment  Goal: Absence of physical injury  1/10/2024 1007 by Leighton Wren, RN  Outcome: Progressing  1/10/2024 0404 by Osvaldo Zavala, RN  Outcome: Progressing     Problem: Discharge Planning  Goal: Discharge to home or other facility with appropriate resources  Outcome: Progressing     Problem: Safety - Adult  Goal: Free from fall injury  1/10/2024 1007 by Leighton Wren, RN  Outcome: Progressing  1/10/2024 0404 by Osvaldo Zavala, RN  Outcome: Progressing

## 2024-01-10 NOTE — PROGRESS NOTES
[facility] Hospitalist Progress Note    Admitting Date and Time: 1/8/2024 11:15 AM  Admit Dx: Generalized weakness [R53.1]  Coronavirus infection [B34.2]  Pneumonia of both lower lobes due to infectious organism [J18.9]  Coronavirus infection, unspecified [B34.2]  Aneurysm of ascending aorta without rupture (HCC) [I71.21]    Subjective:  Patient is being followed for Generalized weakness [R53.1]  Coronavirus infection [B34.2]  Pneumonia of both lower lobes due to infectious organism [J18.9]  Coronavirus infection, unspecified [B34.2]  Aneurysm of ascending aorta without rupture (HCC) [I71.21]   Pt seen and examined this morning  No acute events overnight  In no acute distress today  On 1 L NC, saturating well    ROS: Unable to assess     cefTRIAXone (ROCEPHIN) IV  2,000 mg IntraVENous Q24H    sodium chloride flush  5-40 mL IntraVENous 2 times per day    enoxaparin  30 mg SubCUTAneous Daily     sodium chloride flush, 5-40 mL, PRN  sodium chloride, , PRN  ondansetron, 4 mg, Q8H PRN   Or  ondansetron, 4 mg, Q6H PRN  polyethylene glycol, 17 g, Daily PRN  acetaminophen, 650 mg, Q6H PRN   Or  acetaminophen, 650 mg, Q6H PRN  acetaminophen, 500 mg, Q6H PRN         Objective:    /79   Pulse 78   Temp 97.8 °F (36.6 °C) (Oral)   Resp 18   Ht 1.524 m (5')   Wt 54.4 kg (120 lb)   SpO2 98%   BMI 23.44 kg/m²     General Appearance: alert and awake, no acute distress, very hard of hearing  Skin: warm and dry  Head: normocephalic and atraumatic  Eyes: pupils equal, round, and reactive to light, extraocular eye movements intact, conjunctivae normal  Neck: neck supple and non tender without mass   Pulmonary/Chest: clear to auscultation bilaterally- no wheezes, rales or rhonchi, normal air movement, no respiratory distress  Cardiovascular: normal rate, normal S1 and S2 and no carotid bruits  Abdomen: soft, non-tender, non-distended, normal bowel sounds, no masses or organomegaly  Extremities: no cyanosis, no

## 2024-01-10 NOTE — PROGRESS NOTES
Physical Therapy  Facility/Department: [address] INTERNAL MEDICINE 2  Physical Therapy Initial Assessment    Name: Rosa Isela Weaver  : 1933  MRN: [id number]  Date of Service: 1/10/2024        Patient Diagnosis(es): The primary encounter diagnosis was Generalized weakness. Diagnoses of Pneumonia of both lower lobes due to infectious organism, Aneurysm of ascending aorta without rupture (HCC), and Coronavirus infection, unspecified were also pertinent to this visit.  Past Medical History:  has no past medical history on file.  Past Surgical History:  has no past surgical history on file.      Evaluating Therapist: Jeri Bang PT    Room #:  0408/0408-A  Diagnosis:  Generalized weakness [R53.1]  Coronavirus infection [B34.2]  Pneumonia of both lower lobes due to infectious organism [J18.9]  Coronavirus infection, unspecified [B34.2]  Aneurysm of ascending aorta without rupture (HCC) [I71.21]  Precautions:  falls, alarm, O2, hard of hearing      Social:  Pt lives alone in a 1 floor plan. Typically independent with cane. Recently using ww. Increasing weakness at home. Fell getting out of bed.  Pt usually independent all areas and drives.    Initial Evaluation  Date: 1/10/24 Treatment      Short Term/ Long Term   Goals   Was pt agreeable to Eval/treatment? yes     Does pt have pain? No specific c/o pain but pt moans in pain with mobility     Bed Mobility  Rolling: max assist  Supine to sit: max assist  Sit to supine: NT  Scooting: max assist  Min assist   Transfers Sit to stand: dependent  Stand to sit: dependent  Stand pivot: dependent  Mod assist   Ambulation    NT pt unable to fully stand to ww.   20 feet with ww with mod assist   Stair Negotiation  Ascended and descended  NT      LE strength     3+/5    4-/5   balance      SB sitting  Dependent standing     AM-PAC Raw score                        Pt is alert and Oriented   LE ROM: WFL  Sensation: intact  Edema: none  Endurance: fair-  Chair alarm: yes      ASSESSMENT:    Pt displays functional ability as noted in the objective portion of this evaluation.      Patient education  Pt educated on seated exercises in chair    Patient response to education:   Pt verbalized understanding Pt demonstrated skill Pt requires further education in this area   yes yes         Comments:  Pt with difficulty with sit to stand transfers. Several attempts made to stand to ww. Pt unable to fully stand.  Pt sliding legs out in front of her. Dependent to pivot to chair.  Again attempted to stand to ww from chair, pt unable.  Pt  and family instructed in seated exercise in chair, LAQ, ankle pumps and hip flexion.   Pt's/ family goals   1. To get stronger    Conditions Requiring Skilled Therapeutic Intervention:    [x]Decreased strength     []Decreased ROM  [x]Decreased functional mobility  [x]Decreased balance   [x]Decreased endurance   []Decreased posture  []Decreased sensation  []Decreased coordination   []Decreased vision  [x]Decreased safety awareness   []Increased pain       Patient and or family understand(s) diagnosis, prognosis, and plan of care.    Prognosis is good for reaching above PT goals    PHYSICAL THERAPY PLAN OF CARE:    PT POC is established based on physician order and patient diagnosis     Referring provider/PT Order: Arash Chino MD / PT eval and treat      Current Treatment Recommendations:     [x] Strengthening to improve independence with functional mobility   [] ROM to improve independence with functional mobility   [x] Balance Training to improve static/dynamic balance and to reduce fall risk  [x] Endurance Training to improve activity tolerance during functional mobility   [x] Transfer Training to improve safety and independence with all functional transfers   [x] Gait Training to improve gait mechanics, endurance and assess need for appropriate assistive device  [] Stair Training in preparation for safe discharge home and/or into the community   [x]

## 2024-01-10 NOTE — ACP (ADVANCE CARE PLANNING)
Advance Care Planning   Healthcare Decision Maker:    Primary Decision Maker: Padmini Torres - Child - [phone]    Secondary Decision Maker: Osiris Castro - Child - [phone]    Click here to complete Healthcare Decision Makers including selection of the Healthcare Decision Maker Relationship (ie \"Primary\").

## 2024-01-10 NOTE — PROGRESS NOTES
Marco Antonio Ramsey M.D.,[facility]  Conor Joel D.O., F.EZRAOPABLITO., [facility]  Vishal Sue M.D.  Porsha Buckley M.D.   MARIA D GallardoO.        Daily Pulmonary Progress Note    Patient:  Rosa Isela Weaver 89 y.o. female MRN: [id number]     Date of Service: 1/10/2024      Synopsis     We are following patient for strep pneumonia    \"CC\" altered mental status, fatigue    Code status: Full code      Subjective      Patient was seen and examined resting sitting up in the chair on 1 L nasal cannula.  Family is present at the bedside and states she is much more lucid today and doing better.    Review of Systems:  Constitutional: Denies fever, weight loss, night sweats, and fatigue  Skin: Denies pigmentation, dark lesions, and rashes   HEENT: Denies hearing loss, tinnitus, ear drainage, epistaxis, sore throat, and hoarseness.  Cardiovascular: Denies palpitations, chest pain, and chest pressure.  Respiratory: Denies cough, dyspnea at rest, hemoptysis, apnea, and choking.  Gastrointestinal: Denies nausea, vomiting, poor appetite, diarrhea, heartburn or reflux  Genitourinary: Denies dysuria, frequency, urgency or hematuria  Musculoskeletal: Denies myalgias, muscle weakness, and bone pain  Neurological: Denies dizziness, vertigo, headache, and focal weakness  Psychological: Denies anxiety and depression  Endocrine: Denies heat intolerance and cold intolerance  Hematopoietic/Lymphatic: Denies bleeding problems and blood transfusions    24-hour events:  No new events    Objective   OBJECTIVE:   /82   Pulse 84   Temp 99.8 °F (37.7 °C) (Axillary)   Resp 18   Ht 1.524 m (5')   Wt 54.4 kg (120 lb)   SpO2 100%   BMI 23.44 kg/m²   SpO2 Readings from Last 1 Encounters:   01/10/24 100%        I/O:    Intake/Output Summary (Last 24 hours) at 1/10/2024 1314  Last data filed at 1/10/2024 0931  Gross per 24 hour   Intake 1261.43 ml   Output 1400 ml   Net -138.57 ml                      CURRENT MEDS :  Scheduled Meds:

## 2024-01-10 NOTE — PROGRESS NOTES
Physician Progress Note      PATIENT:               STACY CRAWFORD  CSN #:                  [id number]  :                       1933  ADMIT DATE:       2024 11:15 AM  DISCH DATE:  RESPONDING  PROVIDER #:        Neda Jack MD          QUERY TEXT:    Pt admitted with pneumonia. Pt noted to have leukocytosis, elevated lactic,   fever, and tachycardia. If possible, please document in the progress notes and   discharge summary if you are evaluating and /or treating any of the   following:    The medical record reflects the following:  Risk Factors: pneumonia, bacteremia  Clinical Indicators: WBC 16.9, sed 90, lactic 2.4, T 102.5,   Treatment: IV Rocephin, IV Doxycycline, IV fluids    Thank you,  Jeannette AQUINON, RN, CCDS  Clinical Documentation Integrity  [email or web address]  Phone: [phone]  Options provided:  -- Sepsis, present on admission  -- Pneumonia without Sepsis  -- Other - I will add my own diagnosis  -- Disagree - Not applicable / Not valid  -- Disagree - Clinically unable to determine / Unknown  -- Refer to Clinical Documentation Reviewer    PROVIDER RESPONSE TEXT:    This patient has sepsis which was present on admission.    Query created by: Jeannette Soto on 1/10/2024 7:58 AM      Electronically signed by:  Neda Jack MD 1/10/2024 1:53 PM

## 2024-01-10 NOTE — CARE COORDINATION
Introduced my self and provided explanation of CM role to patient and her daughters at bedside.  Patient is awake, alert to self only.  Very Hard of hearing.  Daughters report acute change in both physical and cognitive capacities which they are attributing to pneumonia/bacteremia.  They verbalize patient was independent as recent as 1 week ago, decorated for the holidays, drives, shops keeps house etc.  She has no hx of HHC nor snf/gabbi.  Patient is established with a pcp and denies any issue with retail pharmaceutical coverage.  Daughter Padmini is a nurse and is anticipating patient will likely require a brief rehab stay post hospital discharge.  PT [facility] is 8 of 24  Provider list is reviewed.  Daughter requests a few hours to make selection (She will discuss with her daughter, a local PT).  Patient will need followed and assisted with discharge planning as necessary.   Explained ELOS of 48 hours; patient's daughters voiced understanding and agreement.    NISHA Chairez RN  [facility] Case Management  [phone]      Facility choices per family  [facility]    Referral called to Kisha with [facility]  Will await her review and response regarding bed availability/acceptance.  NISHA Chairez RN  [facility] Case Management  [phone]

## 2024-01-10 NOTE — PROGRESS NOTES
[facility]  [facility]  Progress Note    SUBJECTIVE:  Chief Complaint   Patient presents with    Altered Mental Status     Began today upon waking.     Fatigue     Pretty fatigued for the past couple days. Normally very active, lives alone.      The patient is doing somewhat better.  Denies dyspnea.  No pain.  Tolerating antibiotic.  Grade fever    Review of systems:  As stated above in the chief complaint, otherwise negative.    Medications:  Scheduled Meds:   cefTRIAXone (ROCEPHIN) IV  2,000 mg IntraVENous Q24H    sodium chloride flush  5-40 mL IntraVENous 2 times per day    enoxaparin  30 mg SubCUTAneous Daily     Continuous Infusions:   sodium chloride      sodium chloride       PRN Meds:sodium chloride flush, sodium chloride, ondansetron **OR** ondansetron, polyethylene glycol, acetaminophen **OR** acetaminophen, acetaminophen    OBJECTIVE:  /79   Pulse 78   Temp 97.8 °F (36.6 °C)   Resp 18   Ht 1.524 m (5')   Wt 54.4 kg (120 lb)   SpO2 98%   BMI 23.44 kg/m²   Temp  Av.5 °F (36.9 °C)  Min: 97.5 °F (36.4 °C)  Max: 100.3 °F (37.9 °C)  Constitutional: The patient is seen up in a chair.  She is awake and in no distress.  She is hard of hearing.  Follows commands.  O2 by nasal cannula.  Family in room.  Skin: Warm and dry. No rashes were noted.   HEENT: Round and reactive pupils.  Moist mucous membranes.  No ulcerations or thrush.  Neck: Supple to movements.   Chest: No use of accessory muscles to breathe. Symmetrical expansion.  Bibasilar crackles posteriorly.  Cardiovascular: S1 and S2 are rhythmic and regular. No murmurs appreciated.   Abdomen: Positive bowel sounds to auscultation. Benign to palpation.  Extremities: No clubbing, no cyanosis, no edema.  Lines: Peripheral.    Laboratory and Tests:  No results found for: \"CRP\"  Lab Results   Component Value Date    SEDRATE 90 (H) 2024       Radiology:      Microbiology:   Rapid influenza: Negative  Rapid

## 2024-01-11 PROBLEM — K59.00 CONSTIPATION: Status: ACTIVE | Noted: 2024-01-11

## 2024-01-11 LAB
ACB COMPLEX DNA BLD POS QL NAA+NON-PROBE: NOT DETECTED
ALBUMIN SERPL-MCNC: 2.4 G/DL (ref 3.5–5.2)
ALP SERPL-CCNC: 113 U/L (ref 35–104)
ALT SERPL-CCNC: 46 U/L (ref 0–32)
ANION GAP SERPL CALCULATED.3IONS-SCNC: 12 MMOL/L (ref 7–16)
AST SERPL-CCNC: 43 U/L (ref 0–31)
B FRAGILIS DNA BLD POS QL NAA+NON-PROBE: NOT DETECTED
BASOPHILS # BLD: 0 K/UL (ref 0–0.2)
BASOPHILS NFR BLD: 0 % (ref 0–2)
BILIRUB SERPL-MCNC: 0.3 MG/DL (ref 0–1.2)
BIOFIRE TEST COMMENT: ABNORMAL
BUN SERPL-MCNC: 19 MG/DL (ref 6–23)
C ALBICANS DNA BLD POS QL NAA+NON-PROBE: NOT DETECTED
C AURIS DNA BLD POS QL NAA+NON-PROBE: NOT DETECTED
C GATTII+NEOFOR DNA BLD POS QL NAA+N-PRB: NOT DETECTED
C GLABRATA DNA BLD POS QL NAA+NON-PROBE: NOT DETECTED
C KRUSEI DNA BLD POS QL NAA+NON-PROBE: NOT DETECTED
C PARAP DNA BLD POS QL NAA+NON-PROBE: NOT DETECTED
C TROPICLS DNA BLD POS QL NAA+NON-PROBE: NOT DETECTED
CALCIUM SERPL-MCNC: 7.8 MG/DL (ref 8.6–10.2)
CHLORIDE SERPL-SCNC: 98 MMOL/L (ref 98–107)
CO2 SERPL-SCNC: 19 MMOL/L (ref 22–29)
CREAT SERPL-MCNC: 0.6 MG/DL (ref 0.5–1)
E CLOAC COMP DNA BLD POS NAA+NON-PROBE: NOT DETECTED
E COLI DNA BLD POS QL NAA+NON-PROBE: NOT DETECTED
E FAECALIS DNA BLD POS QL NAA+NON-PROBE: NOT DETECTED
E FAECIUM DNA BLD POS QL NAA+NON-PROBE: NOT DETECTED
ENTEROBACTERALES DNA BLD POS NAA+N-PRB: NOT DETECTED
EOSINOPHIL # BLD: 0 K/UL (ref 0.05–0.5)
EOSINOPHILS RELATIVE PERCENT: 0 % (ref 0–6)
ERYTHROCYTE [DISTWIDTH] IN BLOOD BY AUTOMATED COUNT: 12.8 % (ref 11.5–15)
GFR SERPL CREATININE-BSD FRML MDRD: >60 ML/MIN/1.73M2
GLUCOSE SERPL-MCNC: 102 MG/DL (ref 74–99)
GP B STREP DNA BLD POS QL NAA+NON-PROBE: NOT DETECTED
HAEM INFLU DNA BLD POS QL NAA+NON-PROBE: NOT DETECTED
HCT VFR BLD AUTO: 28.6 % (ref 34–48)
HGB BLD-MCNC: 9.7 G/DL (ref 11.5–15.5)
K OXYTOCA DNA BLD POS QL NAA+NON-PROBE: NOT DETECTED
KLEBSIELLA SP DNA BLD POS QL NAA+NON-PRB: NOT DETECTED
KLEBSIELLA SP DNA BLD POS QL NAA+NON-PRB: NOT DETECTED
L MONOCYTOG DNA BLD POS QL NAA+NON-PROBE: NOT DETECTED
LYMPHOCYTES NFR BLD: 14.22 K/UL (ref 1.5–4)
LYMPHOCYTES RELATIVE PERCENT: 50 % (ref 20–42)
MCH RBC QN AUTO: 31 PG (ref 26–35)
MCHC RBC AUTO-ENTMCNC: 33.9 G/DL (ref 32–34.5)
MCV RBC AUTO: 91.4 FL (ref 80–99.9)
METAMYELOCYTES ABSOLUTE COUNT: 0.25 K/UL (ref 0–0.12)
METAMYELOCYTES: 1 % (ref 0–1)
MICROORGANISM SPEC CULT: ABNORMAL
MICROORGANISM SPEC CULT: ABNORMAL
MICROORGANISM/AGENT SPEC: ABNORMAL
MICROORGANISM/AGENT SPEC: ABNORMAL
MONOCYTES NFR BLD: 0.98 K/UL (ref 0.1–0.95)
MONOCYTES NFR BLD: 4 % (ref 2–12)
N MEN DNA BLD POS QL NAA+NON-PROBE: NOT DETECTED
NEUTROPHILS NFR BLD: 45 % (ref 43–80)
NEUTS SEG NFR BLD: 12.75 K/UL (ref 1.8–7.3)
P AERUGINOSA DNA BLD POS NAA+NON-PROBE: NOT DETECTED
PLATELET # BLD AUTO: 267 K/UL (ref 130–450)
PMV BLD AUTO: 8.8 FL (ref 7–12)
POTASSIUM SERPL-SCNC: 3.6 MMOL/L (ref 3.5–5)
PROT SERPL-MCNC: 5.5 G/DL (ref 6.4–8.3)
PROTEUS SP DNA BLD POS QL NAA+NON-PROBE: NOT DETECTED
RBC # BLD AUTO: 3.13 M/UL (ref 3.5–5.5)
RBC # BLD: ABNORMAL 10*6/UL
S AUREUS DNA BLD POS QL NAA+NON-PROBE: NOT DETECTED
S AUREUS+CONS DNA BLD POS NAA+NON-PROBE: NOT DETECTED
S EPIDERMIDIS DNA BLD POS QL NAA+NON-PRB: NOT DETECTED
S LUGDUNENSIS DNA BLD POS QL NAA+NON-PRB: NOT DETECTED
S MALTOPHILIA DNA BLD POS QL NAA+NON-PRB: NOT DETECTED
S MARCESCENS DNA BLD POS NAA+NON-PROBE: NOT DETECTED
S PNEUM DNA BLD POS QL NAA+NON-PROBE: DETECTED
S PYO DNA BLD POS QL NAA+NON-PROBE: NOT DETECTED
SALMONELLA DNA BLD POS QL NAA+NON-PROBE: NOT DETECTED
SERVICE CMNT-IMP: ABNORMAL
SERVICE CMNT-IMP: ABNORMAL
SODIUM SERPL-SCNC: 129 MMOL/L (ref 132–146)
SPECIMEN DESCRIPTION: ABNORMAL
SPECIMEN DESCRIPTION: ABNORMAL
STREPTOCOCCUS DNA BLD POS NAA+NON-PROBE: DETECTED
WBC OTHER # BLD: 28.2 K/UL (ref 4.5–11.5)

## 2024-01-11 PROCEDURE — 6370000000 HC RX 637 (ALT 250 FOR IP): Performed by: SPECIALIST

## 2024-01-11 PROCEDURE — 80053 COMPREHEN METABOLIC PANEL: CPT

## 2024-01-11 PROCEDURE — 2060000000 HC ICU INTERMEDIATE R&B

## 2024-01-11 PROCEDURE — 6360000002 HC RX W HCPCS: Performed by: INTERNAL MEDICINE

## 2024-01-11 PROCEDURE — 2580000003 HC RX 258: Performed by: INTERNAL MEDICINE

## 2024-01-11 PROCEDURE — 97165 OT EVAL LOW COMPLEX 30 MIN: CPT

## 2024-01-11 PROCEDURE — 2700000000 HC OXYGEN THERAPY PER DAY

## 2024-01-11 PROCEDURE — 97530 THERAPEUTIC ACTIVITIES: CPT

## 2024-01-11 PROCEDURE — 36415 COLL VENOUS BLD VENIPUNCTURE: CPT

## 2024-01-11 PROCEDURE — 6370000000 HC RX 637 (ALT 250 FOR IP): Performed by: INTERNAL MEDICINE

## 2024-01-11 PROCEDURE — 85025 COMPLETE CBC W/AUTO DIFF WBC: CPT

## 2024-01-11 RX ORDER — CEFUROXIME AXETIL 500 MG/1
500 TABLET ORAL EVERY 12 HOURS SCHEDULED
Status: DISCONTINUED | OUTPATIENT
Start: 2024-01-11 | End: 2024-01-12 | Stop reason: HOSPADM

## 2024-01-11 RX ORDER — SENNOSIDES A AND B 8.6 MG/1
2 TABLET, FILM COATED ORAL NIGHTLY
Status: DISCONTINUED | OUTPATIENT
Start: 2024-01-11 | End: 2024-01-12 | Stop reason: HOSPADM

## 2024-01-11 RX ORDER — POLYETHYLENE GLYCOL 3350 17 G/17G
17 POWDER, FOR SOLUTION ORAL DAILY
Status: DISCONTINUED | OUTPATIENT
Start: 2024-01-11 | End: 2024-01-12 | Stop reason: HOSPADM

## 2024-01-11 RX ADMIN — SODIUM CHLORIDE: 9 INJECTION, SOLUTION INTRAVENOUS at 12:33

## 2024-01-11 RX ADMIN — CEFUROXIME AXETIL 500 MG: 500 TABLET ORAL at 21:32

## 2024-01-11 RX ADMIN — SENNOSIDES 17.2 MG: 8.6 TABLET, COATED ORAL at 21:32

## 2024-01-11 RX ADMIN — ENOXAPARIN SODIUM 30 MG: 100 INJECTION SUBCUTANEOUS at 09:32

## 2024-01-11 RX ADMIN — POLYETHYLENE GLYCOL 3350 17 G: 17 POWDER, FOR SOLUTION ORAL at 19:05

## 2024-01-11 RX ADMIN — Medication 10 ML: at 09:33

## 2024-01-11 ASSESSMENT — PAIN SCALES - GENERAL: PAINLEVEL_OUTOF10: 0

## 2024-01-11 NOTE — PLAN OF CARE
Problem: ABCDS Injury Assessment  Goal: Absence of physical injury  1/11/2024 1000 by Leighton Wren RN  Outcome: Progressing  1/11/2024 0408 by Ragini Hsu RN  Outcome: Progressing     Problem: Discharge Planning  Goal: Discharge to home or other facility with appropriate resources  1/11/2024 1000 by Leighton Wren RN  Outcome: Progressing  1/11/2024 0408 by Ragini Hsu RN  Outcome: Progressing     Problem: Safety - Adult  Goal: Free from fall injury  1/11/2024 1000 by Leighton Wren RN  Outcome: Progressing  1/11/2024 0408 by Ragini Hsu RN  Outcome: Progressing     Problem: Skin/Tissue Integrity  Goal: Absence of new skin breakdown  Description: 1.  Monitor for areas of redness and/or skin breakdown  2.  Assess vascular access sites hourly  3.  Every 4-6 hours minimum:  Change oxygen saturation probe site  4.  Every 4-6 hours:  If on nasal continuous positive airway pressure, respiratory therapy assess nares and determine need for appliance change or resting period.  Outcome: Progressing

## 2024-01-11 NOTE — CARE COORDINATION
Received notification from Kisha at [facility] that facility did receive auth from [facility] Medicare and can accept patient for SNF stay once patient is medically approved for transfer  Gato Camacho, MSN RN  [facility] Case Management  [phone]

## 2024-01-11 NOTE — PLAN OF CARE
Problem: ABCDS Injury Assessment  Goal: Absence of physical injury  Outcome: Progressing     Problem: Discharge Planning  Goal: Discharge to home or other facility with appropriate resources  Outcome: Progressing     Problem: Safety - Adult  Goal: Free from fall injury  Outcome: Progressing

## 2024-01-11 NOTE — PROGRESS NOTES
Marco Antonio Ramsey M.D.,[facility]  Conor Joel D.O., LÓPEZ., [facility]  Vishal Sue M.D.  Porsha Buckley M.D.   MARY Gallardo.O.        Daily Pulmonary Progress Note    Patient:  Rosa Isela Weaver 89 y.o. female MRN: [id number]     Date of Service: 1/11/2024      Synopsis     We are following patient for strep pneumonia    \"CC\" altered mental status, fatigue    Code status: Full code      Subjective      Patient was seen and examined resting sitting up in the chair on 1 L nasal cannula.  SpO2 99%.  Nasal cannula removed.  Patient alert and oriented and doing very well.  Clinically she looks great.  Family present at the bedside.  WBCs increased and are up to 28.2.    Review of Systems:  Constitutional: Denies fever, weight loss, night sweats, and fatigue  Skin: Denies pigmentation, dark lesions, and rashes   HEENT: Denies hearing loss, tinnitus, ear drainage, epistaxis, sore throat, and hoarseness.  Cardiovascular: Denies palpitations, chest pain, and chest pressure.  Respiratory: Denies cough, dyspnea at rest, hemoptysis, apnea, and choking.  Gastrointestinal: Denies nausea, vomiting, poor appetite, diarrhea, heartburn or reflux  Genitourinary: Denies dysuria, frequency, urgency or hematuria  Musculoskeletal: Denies myalgias, muscle weakness, and bone pain  Neurological: Denies dizziness, vertigo, headache, and focal weakness  Psychological: Denies anxiety and depression  Endocrine: Denies heat intolerance and cold intolerance  Hematopoietic/Lymphatic: Denies bleeding problems and blood transfusions    24-hour events:  No new events    Objective   OBJECTIVE:   BP (!) 135/90   Pulse 82   Temp 98.3 °F (36.8 °C) (Oral)   Resp 20   Ht 1.524 m (5')   Wt 54.4 kg (120 lb)   SpO2 99%   BMI 23.44 kg/m²   SpO2 Readings from Last 1 Encounters:   01/11/24 99%        I/O:    Intake/Output Summary (Last 24 hours) at 1/11/2024 1122  Last data filed at 1/11/2024 0933  Gross per 24 hour   Intake 10 ml   Output

## 2024-01-11 NOTE — PROGRESS NOTES
OCCUPATIONAL THERAPY INITIAL EVALUATION    [facility]   [address] [address] [facility]        Date:2024                                                  Patient Name: Rosa Isela Weaver    MRN: [id number]    : 1933    Room: [address] [place] [address]     Evaluating OT: Teagn Brooks OTR/L #[id number]    Referring Provider:  Arash Chino MD     Specific Provider Orders/Date:  OT Eval and Treat, 23     Diagnosis:   1. Generalized weakness    2. Pneumonia of both lower lobes due to infectious organism    3. Aneurysm of ascending aorta without rupture (HCC)    4. Coronavirus infection, unspecified        Surgery: None       Pertinent Medical History: None documented       Precautions:  Fall Risk, falls and alarm, [place], O2 (new user)     Assessment of current deficits    [x] Functional mobility  [x]ADLs  [x] Strength               [x]Cognition    [x] Functional transfers   [x] IADLs         [x] Safety Awareness   [x]Endurance    [] Fine Coordination              [x] Balance      [] Vision/perception   []Sensation     []Gross Motor Coordination  [] ROM  [] Delirium                   [] Motor Control     OT PLAN OF CARE   OT POC based on physician orders, patient diagnosis and results of clinical assessment    Frequency/Duration 1-3 days/wk for 2 weeks PRN     Specific OT Treatment Interventions to include:   * Instruction/training on adapted ADL techniques and AE recommendations to increase functional independence within precautions       * Training on energy conservation strategies, correct breathing pattern and techniques to improve independence/tolerance for self-care routine  * Functional transfer/mobility training/DME recommendations for increased independence, safety, and fall prevention  * Patient/Family education to increase follow through with safety techniques and functional independence  * Recommendation of environmental modifications for increased

## 2024-01-11 NOTE — PROGRESS NOTES
[facility] Hospitalist Progress Note    Admitting Date and Time: 1/8/2024 11:15 AM  Admit Dx: Generalized weakness [R53.1]  Coronavirus infection [B34.2]  Pneumonia of both lower lobes due to infectious organism [J18.9]  Coronavirus infection, unspecified [B34.2]  Aneurysm of ascending aorta without rupture (HCC) [I71.21]    Subjective:  Patient is being followed for Generalized weakness [R53.1]  Coronavirus infection [B34.2]  Pneumonia of both lower lobes due to infectious organism [J18.9]  Coronavirus infection, unspecified [B34.2]  Aneurysm of ascending aorta without rupture (HCC) [I71.21]   Patient denies complaint.  Daughter at bedside all questions answered  No CP or SOB  No fever or chills   No uncontrolled pain  No vomiting or diarrhea   No events reported overnight  Chart reviewed      cefTRIAXone (ROCEPHIN) IV  2,000 mg IntraVENous Q24H    sodium chloride flush  5-40 mL IntraVENous 2 times per day    enoxaparin  30 mg SubCUTAneous Daily     sodium chloride flush, 5-40 mL, PRN  sodium chloride, , PRN  ondansetron, 4 mg, Q8H PRN   Or  ondansetron, 4 mg, Q6H PRN  polyethylene glycol, 17 g, Daily PRN  acetaminophen, 650 mg, Q6H PRN   Or  acetaminophen, 650 mg, Q6H PRN         Objective:    BP (!) 135/90   Pulse 82   Temp 98.3 °F (36.8 °C) (Oral)   Resp 20   Ht 1.524 m (5')   Wt 54.4 kg (120 lb)   SpO2 99%   BMI 23.44 kg/m²     General Appearance: alert and awake, no acute distress, very hard of hearing  Skin: warm and dry  Head: normocephalic and atraumatic  Eyes: pupils equal, round, and reactive to light, extraocular eye movements intact, conjunctivae normal  Neck: neck supple and non tender without mass   Pulmonary/Chest: clear to auscultation bilaterally- no wheezes, rales or rhonchi, normal air movement, no respiratory distress  Cardiovascular: normal rate, normal S1 and S2 and no carotid bruits  Abdomen: soft, non-tender, non-distended, normal bowel sounds, no masses or

## 2024-01-11 NOTE — DISCHARGE INSTR - COC
Continuity of Care Form    Patient Name: Rosa Isela Weaver   :  1933  MRN:  [id number]    Admit date:  2024  Discharge date:  2024    Code Status Order: Full Code   Advance Directives:     Admitting Physician:  Arash Chino MD  PCP: Lew Gramajo MD    Discharging Nurse: Sherry Ibrahim RN BSN  Discharging Hospital Unit/Room#: 0408/0408-A  Discharging Unit Phone Number: [phone]    Emergency Contact:   Extended Emergency Contact Information  Primary Emergency Contact: Padmini Torres   [facility]  Home Phone: [phone]  Mobile Phone: [phone]  Relation: Child  Secondary Emergency Contact: Osiris Castro   [facility]  Home Phone: [phone]  Relation: Child    Past Surgical History:  No past surgical history on file.    Immunization History:   Immunization History   Administered Date(s) Administered    COVID-19, PFIZER PURPLE top, DILUTE for use, (age 12 y+), 30mcg/0.3mL 2021, 2021       Active Problems:  Patient Active Problem List   Diagnosis Code    Acute nasopharyngitis J00    Pericardial effusion I31.39    Coronavirus infection B34.2    Lethargic R53.83       Isolation/Infection:   Isolation            No Isolation          Patient Infection Status       None to display                     Nurse Assessment:  Last Vital Signs: BP (!) 142/78   Pulse (!) 102   Temp 97.8 °F (36.6 °C) (Oral)   Resp 20   Ht 1.524 m (5')   Wt 54.4 kg (120 lb)   SpO2 99%   BMI 23.44 kg/m²     Last documented pain score (0-10 scale): Pain Level: 0  Last Weight:   Wt Readings from Last 1 Encounters:   24 54.4 kg (120 lb)     Mental Status:  disoriented and alert    IV Access:  - None    Nursing Mobility/ADLs:  Walking   Dependent  Transfer  Dependent  Bathing  Dependent  Dressing  Assisted  Toileting  Dependent  Feeding  Dependent  Med Admin  Dependent  Med Delivery   whole    Wound Care Documentation and Therapy:        Elimination:  Continence:

## 2024-01-11 NOTE — PROGRESS NOTES
Patient's family reported no BM for a few days. Offered Prune juice or Glycolax. Patient selected prune juice.

## 2024-01-11 NOTE — PROGRESS NOTES
[facility]  [facility]  Progress Note    SUBJECTIVE:  Chief Complaint   Patient presents with    Altered Mental Status     Began today upon waking.     Fatigue     Pretty fatigued for the past couple days. Normally very active, lives alone.      Patient denies dyspnea.  She is tolerating antibiotic.  No fevers.    Review of systems:  As stated above in the chief complaint, otherwise negative.    Medications:  Scheduled Meds:   cefTRIAXone (ROCEPHIN) IV  2,000 mg IntraVENous Q24H    sodium chloride flush  5-40 mL IntraVENous 2 times per day    enoxaparin  30 mg SubCUTAneous Daily     Continuous Infusions:   sodium chloride 75 mL/hr at 01/10/24 0932    sodium chloride       PRN Meds:sodium chloride flush, sodium chloride, ondansetron **OR** ondansetron, polyethylene glycol, acetaminophen **OR** acetaminophen    OBJECTIVE:  BP (!) 135/90   Pulse 82   Temp 98.3 °F (36.8 °C)   Resp 20   Ht 1.524 m (5')   Wt 54.4 kg (120 lb)   SpO2 99%   BMI 23.44 kg/m²   Temp  Av °F (37.2 °C)  Min: 98.3 °F (36.8 °C)  Max: 99.8 °F (37.7 °C)  Constitutional: The patient is seen up in a chair.  She is awake and in no distress.  She is hard of hearing.  Family in room.  Skin: Warm and dry. No rashes were noted.   HEENT: Round and reactive pupils.  Moist mucous membranes.  No ulcerations or thrush.  Neck: Supple to movements.   Chest: No use of accessory muscles to breathe. Symmetrical expansion.  Bibasilar crackles posteriorly.  Cardiovascular: S1 and S2 are rhythmic and regular. No murmurs appreciated.   Abdomen: Positive bowel sounds to auscultation. Benign to palpation.  Extremities: No edema.  Lines: Peripheral.    Laboratory and Tests:  No results found for: \"CRP\"  Lab Results   Component Value Date    SEDRATE 90 (H) 2024       Radiology:      Microbiology:   Rapid influenza: Negative  Rapid SARS-CoV-2: Negative  Rapid RSV: Negative  Respiratory panel: Coronavirus OC43  Blood cultures  1/8/2024: Streptococcus pneumoniae in 4 of 4 bottles  Blood culture 1/9/2024: Negative so far  Urine culture 1/8/2024: Negative  Streptococcus pneumoniae urine Ag: Positive  Legionella pneumophila urine antigen: Negative    ASSESSMENT:  Coronavirus infection with possible viral pneumonia  Superimposed bacterial pneumonia with Streptococcus pneumoniae  Streptococcus pneumoniae bacteremia associated to the above  Invasive pneumococcal disease  Fever associated to the above-resolved  Mild elevation of transaminases  Leukocytosis with lymphocytosis.  Probable chronic lymphocytic leukemia (CLL)    PLAN:  Change Ceftriaxone to Cefuroxime for a minimum of 7 days.  Reconciled  Patient can be discharged from ID standpoint    Spoke with nursing.  Spoke with family.    Germán Nicole MD  9:14 AM  1/11/2024

## 2024-01-12 VITALS
SYSTOLIC BLOOD PRESSURE: 145 MMHG | OXYGEN SATURATION: 94 % | WEIGHT: 138.45 LBS | HEART RATE: 84 BPM | TEMPERATURE: 97.1 F | BODY MASS INDEX: 27.18 KG/M2 | DIASTOLIC BLOOD PRESSURE: 70 MMHG | RESPIRATION RATE: 16 BRPM | HEIGHT: 60 IN

## 2024-01-12 LAB
ALBUMIN SERPL-MCNC: 2.5 G/DL (ref 3.5–5.2)
ALP SERPL-CCNC: 112 U/L (ref 35–104)
ALT SERPL-CCNC: 54 U/L (ref 0–32)
ANION GAP SERPL CALCULATED.3IONS-SCNC: 13 MMOL/L (ref 7–16)
AST SERPL-CCNC: 39 U/L (ref 0–31)
BASOPHILS # BLD: 0 K/UL (ref 0–0.2)
BASOPHILS NFR BLD: 0 % (ref 0–2)
BILIRUB SERPL-MCNC: 0.4 MG/DL (ref 0–1.2)
BUN SERPL-MCNC: 16 MG/DL (ref 6–23)
CALCIUM SERPL-MCNC: 8 MG/DL (ref 8.6–10.2)
CHLORIDE SERPL-SCNC: 97 MMOL/L (ref 98–107)
CO2 SERPL-SCNC: 18 MMOL/L (ref 22–29)
CREAT SERPL-MCNC: 0.6 MG/DL (ref 0.5–1)
EOSINOPHIL # BLD: 0.38 K/UL (ref 0.05–0.5)
EOSINOPHILS RELATIVE PERCENT: 1 % (ref 0–6)
ERYTHROCYTE [DISTWIDTH] IN BLOOD BY AUTOMATED COUNT: 12.5 % (ref 11.5–15)
GFR SERPL CREATININE-BSD FRML MDRD: >60 ML/MIN/1.73M2
GLUCOSE SERPL-MCNC: 95 MG/DL (ref 74–99)
HCT VFR BLD AUTO: 29.8 % (ref 34–48)
HGB BLD-MCNC: 10.3 G/DL (ref 11.5–15.5)
LYMPHOCYTES NFR BLD: 21.12 K/UL (ref 1.5–4)
LYMPHOCYTES RELATIVE PERCENT: 55 % (ref 20–42)
MCH RBC QN AUTO: 31.5 PG (ref 26–35)
MCHC RBC AUTO-ENTMCNC: 34.6 G/DL (ref 32–34.5)
MCV RBC AUTO: 91.1 FL (ref 80–99.9)
METAMYELOCYTES ABSOLUTE COUNT: 1.15 K/UL (ref 0–0.12)
METAMYELOCYTES: 3 % (ref 0–1)
MONOCYTES NFR BLD: 1.15 K/UL (ref 0.1–0.95)
MONOCYTES NFR BLD: 3 % (ref 2–12)
NEUTROPHILS NFR BLD: 38 % (ref 43–80)
NEUTS SEG NFR BLD: 14.59 K/UL (ref 1.8–7.3)
PLATELET # BLD AUTO: 300 K/UL (ref 130–450)
PMV BLD AUTO: 8.8 FL (ref 7–12)
POTASSIUM SERPL-SCNC: 3.7 MMOL/L (ref 3.5–5)
PROT SERPL-MCNC: 5.5 G/DL (ref 6.4–8.3)
RBC # BLD AUTO: 3.27 M/UL (ref 3.5–5.5)
RBC # BLD: ABNORMAL 10*6/UL
SODIUM SERPL-SCNC: 128 MMOL/L (ref 132–146)
WBC OTHER # BLD: 38.4 K/UL (ref 4.5–11.5)

## 2024-01-12 PROCEDURE — 99239 HOSP IP/OBS DSCHRG MGMT >30: CPT | Performed by: INTERNAL MEDICINE

## 2024-01-12 PROCEDURE — 6370000000 HC RX 637 (ALT 250 FOR IP): Performed by: INTERNAL MEDICINE

## 2024-01-12 PROCEDURE — 6370000000 HC RX 637 (ALT 250 FOR IP): Performed by: SPECIALIST

## 2024-01-12 PROCEDURE — 6360000002 HC RX W HCPCS: Performed by: INTERNAL MEDICINE

## 2024-01-12 PROCEDURE — 80053 COMPREHEN METABOLIC PANEL: CPT

## 2024-01-12 PROCEDURE — 2580000003 HC RX 258: Performed by: INTERNAL MEDICINE

## 2024-01-12 PROCEDURE — 85025 COMPLETE CBC W/AUTO DIFF WBC: CPT

## 2024-01-12 RX ORDER — CEFUROXIME AXETIL 500 MG/1
500 TABLET ORAL EVERY 12 HOURS SCHEDULED
Qty: 12 TABLET | Refills: 0 | DISCHARGE
Start: 2024-01-12 | End: 2024-01-18

## 2024-01-12 RX ORDER — SENNOSIDES A AND B 8.6 MG/1
2 TABLET, FILM COATED ORAL NIGHTLY
Qty: 60 TABLET | Refills: 0 | DISCHARGE
Start: 2024-01-12 | End: 2024-02-11

## 2024-01-12 RX ORDER — ENOXAPARIN SODIUM 100 MG/ML
40 INJECTION SUBCUTANEOUS DAILY
Status: DISCONTINUED | OUTPATIENT
Start: 2024-01-13 | End: 2024-01-12 | Stop reason: HOSPADM

## 2024-01-12 RX ORDER — ONDANSETRON 4 MG/1
4 TABLET, ORALLY DISINTEGRATING ORAL EVERY 8 HOURS PRN
DISCHARGE
Start: 2024-01-12

## 2024-01-12 RX ORDER — BISACODYL 10 MG
10 SUPPOSITORY, RECTAL RECTAL DAILY PRN
Qty: 30 SUPPOSITORY | Refills: 0 | DISCHARGE
Start: 2024-01-12 | End: 2024-02-11

## 2024-01-12 RX ORDER — POLYETHYLENE GLYCOL 3350 17 G/17G
17 POWDER, FOR SOLUTION ORAL DAILY
Qty: 527 G | Refills: 0 | DISCHARGE
Start: 2024-01-13 | End: 2024-02-12

## 2024-01-12 RX ADMIN — Medication 10 ML: at 08:37

## 2024-01-12 RX ADMIN — ENOXAPARIN SODIUM 30 MG: 100 INJECTION SUBCUTANEOUS at 08:36

## 2024-01-12 RX ADMIN — POLYETHYLENE GLYCOL 3350 17 G: 17 POWDER, FOR SOLUTION ORAL at 08:37

## 2024-01-12 RX ADMIN — CEFUROXIME AXETIL 500 MG: 500 TABLET ORAL at 08:37

## 2024-01-12 NOTE — PROGRESS NOTES
[facility]  [facility]  Progress Note    SUBJECTIVE:  Chief Complaint   Patient presents with    Altered Mental Status     Began today upon waking.     Fatigue     Pretty fatigued for the past couple days. Normally very active, lives alone.      No new problems reported.  Tolerating antibiotics.    Review of systems:  As stated above in the chief complaint, otherwise negative.    Medications:  Scheduled Meds:   [START ON 2024] enoxaparin  40 mg SubCUTAneous Daily    cefUROXime  500 mg Oral 2 times per day    polyethylene glycol  17 g Oral Daily    senna  2 tablet Oral Nightly    sodium chloride flush  5-40 mL IntraVENous 2 times per day     Continuous Infusions:   sodium chloride 75 mL/hr at 24 1233    sodium chloride       PRN Meds:sodium chloride flush, sodium chloride, ondansetron **OR** ondansetron, polyethylene glycol, acetaminophen **OR** acetaminophen    OBJECTIVE:  BP (!) 145/70   Pulse 84   Temp 97.1 °F (36.2 °C) (Axillary)   Resp 16   Ht 1.524 m (5')   Wt 62.8 kg (138 lb 7.2 oz)   SpO2 94%   BMI 27.04 kg/m²   Temp  Av.8 °F (36.6 °C)  Min: 97.1 °F (36.2 °C)  Max: 98.2 °F (36.8 °C)  Constitutional: The patient is lying in bed.  She is awake and in no distress.  She is hard of hearing.  Daughter in room.  Skin: Warm and dry. No rashes were noted.   HEENT: Round and reactive pupils.  Moist mucous membranes.  No ulcerations or thrush.  Neck: Supple to movements.   Chest: No use of accessory muscles to breathe. Symmetrical expansion.  No crackles anteriorly.  Cardiovascular: S1 and S2 are rhythmic and regular. No murmurs appreciated.   Abdomen: Positive bowel sounds to auscultation. Benign to palpation.  Extremities: No edema.  Lines: Peripheral.    Laboratory and Tests:  No results found for: \"CRP\"  Lab Results   Component Value Date    SEDRATE 90 (H) 2024       Radiology:      Microbiology:   Rapid influenza: Negative  Rapid SARS-CoV-2:

## 2024-01-12 NOTE — DISCHARGE SUMMARY
Physician Discharge Summary     Patient ID:  Rosa Isela Weaver  [id number]  89 y.o.  12/16/1933    Admit date: 1/8/2024    Discharge date and time:  1/12/2024    Discharge Diagnoses: Principal Problem:    Coronavirus infection  Active Problems:    Acute nasopharyngitis    Pericardial effusion    Lethargic    Constipation  Resolved Problems:    * No resolved hospital problems. *      Consults: IP CONSULT TO INTERNAL MEDICINE  IP CONSULT TO SOCIAL WORK  IP CONSULT TO INFECTIOUS DISEASES  IP CONSULT TO PULMONOLOGY  IP CONSULT TO IV TEAM  IP CONSULT TO IV TEAM    Procedures: See below    Hospital Course: Coronavirus infection with superimposed bacterial pneumonia.  Strep pneumo.  Continue Rocephin.  Supportive care, breathing treatments, oxygen as needed.  Strep pneumo bacteremia.  Likely 2/2 the above.  ID following.  Continue Rocephin 2 g.  Repeat blood cultures  Acute metabolic encephalopathy.  Likely 2/2 the above.  Continue antibiotics and monitor mentation.  Patient is very hard of hearing and hearing aids do not work so unable to fully assess mentation/orientation  Lactic acidosis.  Likely 2/2 the above.  Received 2 L IVF in the ED. down to 2.4.  Continue IVF, trend  Generalized weakness and fatigue.  Likely 2/2 the above.  Continue supportive care.  PT OT 8/24  Leukocytosis.Lymphocyte predominate  Worse today, up to 38> 29>26 >16.  Patient did not get any steroids. ?  Dehydration.  Continue antibiotics.  Appreciate ID input-- suspected CLL. Hematology follow up as outpatient   Elevated proBNP.  3,164.  CXR with vascular congestion.  Diuresis after lactic acidosis is resolved and hemodynamically stable. ? Underlying CHF.  Bowel regimen     Disposition: Anticipate SNF today    Discharge Exam:  See progress note from today    Condition:  Stable    Disposition: SNF    Patient Instructions:   Current Discharge Medication List        START taking these medications    Details   ondansetron (ZOFRAN-ODT) 4 MG

## 2024-01-12 NOTE — PROGRESS NOTES
DVT Prophylaxis Adjustment Policy (DVT Prophylaxis)     This patient is on DVT Prophylaxis medication that requires a dose adjustment      Date Body Weight IBW  Adjusted BW SCr  CrCl Dialysis status   1/12/2024 62.8 kg (138 lb 7.2 oz) Ideal body weight: 45.5 kg (100 lb 4.9 oz)  Adjusted ideal body weight: 52.4 kg (115 lb 9 oz) Serum creatinine: 0.6 mg/dL 01/12/24 0415  Estimated creatinine clearance: 52 mL/min N/a       Pharmacy has dose-adjusted the DVT Prophylaxis regimen to match   the recommendations from the following table        Ordered Medication:Lovenox 30mg daily    Order Changed/converted to: Lovenox 40mg daily      These changes were made per protocol according to the [facility] Pharmacist   Review for Appropriate Use and Automatic Dose Adjustments of   Subcutaneous Anticoagulants Policy     *Please note this dose may need readjusted if patient's condition changes.    Please contact pharmacy with any questions regarding these changes.    Shakira Morillo RPH  1/12/2024  10:53 AM

## 2024-01-12 NOTE — PROGRESS NOTES
Patient is stable from a pulmonary standpoint.  We will sign off at this time.  Please re-consult if needed.  Thank you.    Electronically signed by SANDER Allen CNP on 1/12/2024 at 6:17 AM

## 2024-01-12 NOTE — PLAN OF CARE
Problem: ABCDS Injury Assessment  Goal: Absence of physical injury  1/12/2024 1001 by Sherry Ibrahim RN  Outcome: Progressing  1/11/2024 2223 by Ragini Hsu RN  Outcome: Progressing     Problem: Discharge Planning  Goal: Discharge to home or other facility with appropriate resources  1/12/2024 1001 by Sherry Ibrahim RN  Outcome: Progressing  1/11/2024 2223 by Ragini Hsu RN  Outcome: Progressing     Problem: Safety - Adult  Goal: Free from fall injury  1/12/2024 1001 by Sherry Ibrahim RN  Outcome: Progressing  1/11/2024 2223 by Ragini Hsu RN  Outcome: Progressing     Problem: Skin/Tissue Integrity  Goal: Absence of new skin breakdown  Description: 1.  Monitor for areas of redness and/or skin breakdown  2.  Assess vascular access sites hourly  3.  Every 4-6 hours minimum:  Change oxygen saturation probe site  4.  Every 4-6 hours:  If on nasal continuous positive airway pressure, respiratory therapy assess nares and determine need for appliance change or resting period.  Outcome: Progressing

## 2024-01-12 NOTE — PLAN OF CARE
Problem: ABCDS Injury Assessment  Goal: Absence of physical injury  1/11/2024 2223 by Ragini Hsu RN  Outcome: Progressing  1/11/2024 1000 by Leighton Wren RN  Outcome: Progressing     Problem: Discharge Planning  Goal: Discharge to home or other facility with appropriate resources  1/11/2024 2223 by Ragini Hsu RN  Outcome: Progressing  1/11/2024 1000 by Leighton Wren RN  Outcome: Progressing     Problem: Safety - Adult  Goal: Free from fall injury  1/11/2024 2223 by Ragini Hsu RN  Outcome: Progressing  1/11/2024 1000 by Leighton Wren RN  Outcome: Progressing     Problem: Skin/Tissue Integrity  Goal: Absence of new skin breakdown  Description: 1.  Monitor for areas of redness and/or skin breakdown  2.  Assess vascular access sites hourly  3.  Every 4-6 hours minimum:  Change oxygen saturation probe site  4.  Every 4-6 hours:  If on nasal continuous positive airway pressure, respiratory therapy assess nares and determine need for appliance change or resting period.  1/11/2024 1000 by Leighton Wren, RN  Outcome: Progressing

## 2024-01-12 NOTE — PROGRESS NOTES
[facility] Hospitalist Progress Note    Admitting Date and Time: 1/8/2024 11:15 AM  Admit Dx: Generalized weakness [R53.1]  Coronavirus infection [B34.2]  Pneumonia of both lower lobes due to infectious organism [J18.9]  Coronavirus infection, unspecified [B34.2]  Aneurysm of ascending aorta without rupture (HCC) [I71.21]    Subjective:  Patient is being followed for Generalized weakness [R53.1]  Coronavirus infection [B34.2]  Pneumonia of both lower lobes due to infectious organism [J18.9]  Coronavirus infection, unspecified [B34.2]  Aneurysm of ascending aorta without rupture (HCC) [I71.21]   Patient denies complaint.  Daughter at bedside all questions answered  No CP or SOB  No fever or chills   No uncontrolled pain  No vomiting or diarrhea   No events reported overnight  Chart reviewed      [START ON 1/13/2024] enoxaparin  40 mg SubCUTAneous Daily    cefUROXime  500 mg Oral 2 times per day    polyethylene glycol  17 g Oral Daily    senna  2 tablet Oral Nightly    sodium chloride flush  5-40 mL IntraVENous 2 times per day     sodium chloride flush, 5-40 mL, PRN  sodium chloride, , PRN  ondansetron, 4 mg, Q8H PRN   Or  ondansetron, 4 mg, Q6H PRN  polyethylene glycol, 17 g, Daily PRN  acetaminophen, 650 mg, Q6H PRN   Or  acetaminophen, 650 mg, Q6H PRN         Objective:    /68   Pulse 81   Temp 98.2 °F (36.8 °C) (Oral)   Resp 18   Ht 1.524 m (5')   Wt 62.8 kg (138 lb 7.2 oz)   SpO2 94%   BMI 27.04 kg/m²     General Appearance: alert and awake, no acute distress, very hard of hearing  Skin: warm and dry  Head: normocephalic and atraumatic  Eyes: pupils equal, round, and reactive to light, extraocular eye movements intact, conjunctivae normal  Neck: neck supple and non tender without mass   Pulmonary/Chest: clear to auscultation bilaterally- no wheezes, rales or rhonchi, normal air movement, no respiratory distress  Cardiovascular: normal rate, normal S1 and S2 and no carotid bruits  Abdomen:  soft, non-tender, non-distended, normal bowel sounds, no masses or organomegaly  Extremities: no cyanosis, no clubbing and no edema  Neurologic: Unable to assess as patient is unable to follow commands due to being hard of hearing        Recent Labs     01/10/24  0755 01/11/24  0530 01/12/24  0415   * 129* 128*   K 3.6 3.6 3.7    98 97*   CO2 18* 19* 18*   BUN 19 19 16   CREATININE 0.6 0.6 0.6   GLUCOSE 108* 102* 95   CALCIUM 8.3* 7.8* 8.0*         Recent Labs     01/10/24  0755 01/11/24  0530 01/12/24  0415   WBC 26.7* 28.2* 38.4*   RBC 3.33* 3.13* 3.27*   HGB 10.5* 9.7* 10.3*   HCT 31.2* 28.6* 29.8*   MCV 93.7 91.4 91.1   MCH 31.5 31.0 31.5   MCHC 33.7 33.9 34.6*   RDW 12.9 12.8 12.5    267 300   MPV 9.1 8.8 8.8           Assessment and Plan:     Principal Problem:    Coronavirus infection  Active Problems:    Acute nasopharyngitis    Pericardial effusion    Lethargic    Constipation  Resolved Problems:    * No resolved hospital problems. *    Coronavirus infection with superimposed bacterial pneumonia.  Strep pneumo.  Continue Rocephin.  Supportive care, breathing treatments, oxygen as needed.  Strep pneumo bacteremia.  Likely 2/2 the above.  ID following.  Continue Rocephin 2 g.  Repeat blood cultures  Acute metabolic encephalopathy.  Likely 2/2 the above.  Continue antibiotics and monitor mentation.  Patient is very hard of hearing and hearing aids do not work so unable to fully assess mentation/orientation  Lactic acidosis.  Likely 2/2 the above.  Received 2 L IVF in the ED. down to 2.4.  Continue IVF, trend  Generalized weakness and fatigue.  Likely 2/2 the above.  Continue supportive care.  PT OT 8/24  Leukocytosis.  Worse today, up to 38> 29>26 >16.  Patient did not get any steroids. ?  Dehydration.  Continue antibiotics.  Appreciate ID input-- suspected CLL. Hematology follow up as outpatient   Elevated proBNP.  3,164.  CXR with vascular congestion.  Diuresis after lactic acidosis is

## 2024-01-14 LAB
MICROORGANISM SPEC CULT: NORMAL
SERVICE CMNT-IMP: NORMAL
SPECIMEN DESCRIPTION: NORMAL

## 2024-06-11 ENCOUNTER — APPOINTMENT (OUTPATIENT)
Dept: GENERAL RADIOLOGY | Age: 89
DRG: 522 | End: 2024-06-11
Payer: MEDICARE

## 2024-06-11 ENCOUNTER — HOSPITAL ENCOUNTER (INPATIENT)
Age: 89
LOS: 3 days | Discharge: NURSING, REHAB OR LONG-TERM CARE | DRG: 522 | End: 2024-06-14
Attending: EMERGENCY MEDICINE | Admitting: INTERNAL MEDICINE
Payer: MEDICARE

## 2024-06-11 DIAGNOSIS — S72.001A CLOSED FRACTURE OF NECK OF RIGHT FEMUR, INITIAL ENCOUNTER (HCC): Primary | ICD-10-CM

## 2024-06-11 LAB
ALBUMIN SERPL-MCNC: 4.3 G/DL (ref 3.5–5.2)
ALP SERPL-CCNC: 86 U/L (ref 35–104)
ALT SERPL-CCNC: 18 U/L (ref 0–32)
ANION GAP SERPL CALCULATED.3IONS-SCNC: 12 MMOL/L (ref 7–16)
AST SERPL-CCNC: 25 U/L (ref 0–31)
ATYPICAL LYMPHOCYTE ABSOLUTE COUNT: 2.01 K/UL (ref 0–0.46)
ATYPICAL LYMPHOCYTES: 11 % (ref 0–4)
BACTERIA URNS QL MICRO: ABNORMAL
BASOPHILS # BLD: 0 K/UL (ref 0–0.2)
BASOPHILS NFR BLD: 0 % (ref 0–2)
BILIRUB SERPL-MCNC: 0.4 MG/DL (ref 0–1.2)
BILIRUB UR QL STRIP: NEGATIVE
BUN SERPL-MCNC: 23 MG/DL (ref 6–23)
CALCIUM SERPL-MCNC: 9.5 MG/DL (ref 8.6–10.2)
CHLORIDE SERPL-SCNC: 104 MMOL/L (ref 98–107)
CLARITY UR: CLEAR
CO2 SERPL-SCNC: 24 MMOL/L (ref 22–29)
COLOR UR: YELLOW
CREAT SERPL-MCNC: 0.9 MG/DL (ref 0.5–1)
EKG ATRIAL RATE: 84 BPM
EKG P AXIS: 61 DEGREES
EKG P-R INTERVAL: 198 MS
EKG Q-T INTERVAL: 398 MS
EKG QRS DURATION: 130 MS
EKG QTC CALCULATION (BAZETT): 470 MS
EKG R AXIS: -97 DEGREES
EKG T AXIS: 16 DEGREES
EKG VENTRICULAR RATE: 84 BPM
EOSINOPHIL # BLD: 0 K/UL (ref 0.05–0.5)
EOSINOPHILS RELATIVE PERCENT: 0 % (ref 0–6)
ERYTHROCYTE [DISTWIDTH] IN BLOOD BY AUTOMATED COUNT: 13.2 % (ref 11.5–15)
GFR, ESTIMATED: 58 ML/MIN/1.73M2
GLUCOSE SERPL-MCNC: 103 MG/DL (ref 74–99)
GLUCOSE UR STRIP-MCNC: NEGATIVE MG/DL
HCT VFR BLD AUTO: 43.1 % (ref 34–48)
HGB BLD-MCNC: 13.9 G/DL (ref 11.5–15.5)
HGB UR QL STRIP.AUTO: ABNORMAL
INR PPP: 1.1
KETONES UR STRIP-MCNC: NEGATIVE MG/DL
LEUKOCYTE ESTERASE UR QL STRIP: NEGATIVE
LYMPHOCYTES NFR BLD: 8.54 K/UL (ref 1.5–4)
LYMPHOCYTES RELATIVE PERCENT: 45 % (ref 20–42)
MCH RBC QN AUTO: 30.5 PG (ref 26–35)
MCHC RBC AUTO-ENTMCNC: 32.3 G/DL (ref 32–34.5)
MCV RBC AUTO: 94.7 FL (ref 80–99.9)
MONOCYTES NFR BLD: 0.5 K/UL (ref 0.1–0.95)
MONOCYTES NFR BLD: 3 % (ref 2–12)
NEUTROPHILS NFR BLD: 42 % (ref 43–80)
NEUTS SEG NFR BLD: 8.04 K/UL (ref 1.8–7.3)
NITRITE UR QL STRIP: NEGATIVE
PH UR STRIP: 7 [PH] (ref 5–9)
PLATELET # BLD AUTO: 170 K/UL (ref 130–450)
PMV BLD AUTO: 8.4 FL (ref 7–12)
POTASSIUM SERPL-SCNC: 4.5 MMOL/L (ref 3.5–5)
PROT SERPL-MCNC: 7.2 G/DL (ref 6.4–8.3)
PROT UR STRIP-MCNC: NEGATIVE MG/DL
PROTHROMBIN TIME: 11.4 SEC (ref 9.3–12.4)
RBC # BLD AUTO: 4.55 M/UL (ref 3.5–5.5)
RBC # BLD: ABNORMAL 10*6/UL
RBC #/AREA URNS HPF: ABNORMAL /HPF
SODIUM SERPL-SCNC: 140 MMOL/L (ref 132–146)
SP GR UR STRIP: 1.01 (ref 1–1.03)
UROBILINOGEN UR STRIP-ACNC: 0.2 EU/DL (ref 0–1)
WBC #/AREA URNS HPF: ABNORMAL /HPF
WBC OTHER # BLD: 19.1 K/UL (ref 4.5–11.5)

## 2024-06-11 PROCEDURE — 73502 X-RAY EXAM HIP UNI 2-3 VIEWS: CPT

## 2024-06-11 PROCEDURE — 81001 URINALYSIS AUTO W/SCOPE: CPT

## 2024-06-11 PROCEDURE — 93010 ELECTROCARDIOGRAM REPORT: CPT | Performed by: INTERNAL MEDICINE

## 2024-06-11 PROCEDURE — 80053 COMPREHEN METABOLIC PANEL: CPT

## 2024-06-11 PROCEDURE — 1200000000 HC SEMI PRIVATE

## 2024-06-11 PROCEDURE — 99223 1ST HOSP IP/OBS HIGH 75: CPT | Performed by: ORTHOPAEDIC SURGERY

## 2024-06-11 PROCEDURE — 73552 X-RAY EXAM OF FEMUR 2/>: CPT

## 2024-06-11 PROCEDURE — 2580000003 HC RX 258: Performed by: INTERNAL MEDICINE

## 2024-06-11 PROCEDURE — 85610 PROTHROMBIN TIME: CPT

## 2024-06-11 PROCEDURE — 93005 ELECTROCARDIOGRAM TRACING: CPT | Performed by: EMERGENCY MEDICINE

## 2024-06-11 PROCEDURE — 71045 X-RAY EXAM CHEST 1 VIEW: CPT

## 2024-06-11 PROCEDURE — 85025 COMPLETE CBC W/AUTO DIFF WBC: CPT

## 2024-06-11 PROCEDURE — 99285 EMERGENCY DEPT VISIT HI MDM: CPT

## 2024-06-11 RX ORDER — ACETAMINOPHEN 650 MG/1
650 SUPPOSITORY RECTAL EVERY 6 HOURS PRN
Status: DISCONTINUED | OUTPATIENT
Start: 2024-06-11 | End: 2024-06-14 | Stop reason: HOSPADM

## 2024-06-11 RX ORDER — POTASSIUM CHLORIDE 20 MEQ/1
40 TABLET, EXTENDED RELEASE ORAL PRN
Status: DISCONTINUED | OUTPATIENT
Start: 2024-06-11 | End: 2024-06-14 | Stop reason: HOSPADM

## 2024-06-11 RX ORDER — SODIUM CHLORIDE 9 MG/ML
INJECTION, SOLUTION INTRAVENOUS PRN
Status: DISCONTINUED | OUTPATIENT
Start: 2024-06-11 | End: 2024-06-14 | Stop reason: HOSPADM

## 2024-06-11 RX ORDER — POLYETHYLENE GLYCOL 3350 17 G/17G
17 POWDER, FOR SOLUTION ORAL DAILY PRN
Status: DISCONTINUED | OUTPATIENT
Start: 2024-06-11 | End: 2024-06-14 | Stop reason: HOSPADM

## 2024-06-11 RX ORDER — ACETAMINOPHEN 325 MG/1
650 TABLET ORAL EVERY 6 HOURS PRN
Status: DISCONTINUED | OUTPATIENT
Start: 2024-06-11 | End: 2024-06-14 | Stop reason: HOSPADM

## 2024-06-11 RX ORDER — HYDROCODONE BITARTRATE AND ACETAMINOPHEN 5; 325 MG/1; MG/1
1 TABLET ORAL EVERY 6 HOURS PRN
Status: DISCONTINUED | OUTPATIENT
Start: 2024-06-11 | End: 2024-06-14 | Stop reason: HOSPADM

## 2024-06-11 RX ORDER — SODIUM CHLORIDE 0.9 % (FLUSH) 0.9 %
5-40 SYRINGE (ML) INJECTION PRN
Status: DISCONTINUED | OUTPATIENT
Start: 2024-06-11 | End: 2024-06-14 | Stop reason: HOSPADM

## 2024-06-11 RX ORDER — ONDANSETRON 4 MG/1
4 TABLET, ORALLY DISINTEGRATING ORAL EVERY 8 HOURS PRN
Status: DISCONTINUED | OUTPATIENT
Start: 2024-06-11 | End: 2024-06-14 | Stop reason: HOSPADM

## 2024-06-11 RX ORDER — MORPHINE SULFATE 2 MG/ML
2 INJECTION, SOLUTION INTRAMUSCULAR; INTRAVENOUS EVERY 4 HOURS PRN
Status: DISCONTINUED | OUTPATIENT
Start: 2024-06-11 | End: 2024-06-14 | Stop reason: HOSPADM

## 2024-06-11 RX ORDER — SODIUM CHLORIDE 0.9 % (FLUSH) 0.9 %
5-40 SYRINGE (ML) INJECTION EVERY 12 HOURS SCHEDULED
Status: DISCONTINUED | OUTPATIENT
Start: 2024-06-11 | End: 2024-06-14 | Stop reason: HOSPADM

## 2024-06-11 RX ORDER — ONDANSETRON 2 MG/ML
4 INJECTION INTRAMUSCULAR; INTRAVENOUS EVERY 6 HOURS PRN
Status: DISCONTINUED | OUTPATIENT
Start: 2024-06-11 | End: 2024-06-14 | Stop reason: HOSPADM

## 2024-06-11 RX ORDER — POTASSIUM CHLORIDE 7.45 MG/ML
10 INJECTION INTRAVENOUS PRN
Status: DISCONTINUED | OUTPATIENT
Start: 2024-06-11 | End: 2024-06-14 | Stop reason: HOSPADM

## 2024-06-11 RX ORDER — MAGNESIUM SULFATE IN WATER 40 MG/ML
2000 INJECTION, SOLUTION INTRAVENOUS PRN
Status: DISCONTINUED | OUTPATIENT
Start: 2024-06-11 | End: 2024-06-14 | Stop reason: HOSPADM

## 2024-06-11 RX ADMIN — SODIUM CHLORIDE, PRESERVATIVE FREE 10 ML: 5 INJECTION INTRAVENOUS at 20:51

## 2024-06-11 ASSESSMENT — PAIN SCALES - GENERAL
PAINLEVEL_OUTOF10: 0
PAINLEVEL_OUTOF10: 6

## 2024-06-11 ASSESSMENT — PAIN - FUNCTIONAL ASSESSMENT: PAIN_FUNCTIONAL_ASSESSMENT: 0-10

## 2024-06-11 NOTE — ED PROVIDER NOTES
HPI:  6/11/24, Time: 11:44 AM EDT         Rosa Isela Weaver is a 89 y.o. female presenting to the ED for mechanical fall with right hip pain.  Patient was buying a lottery ticket when she tripped and fell.  She denies head trauma or loss of consciousness.  She is not anticoagulated.  She reports pain to her right hip.  She denies chest pain, shortness of breath, neck pain, back pain, abdominal pain, nausea, vomiting, and focal numbness or weakness.    --------------------------------------------- PAST HISTORY ---------------------------------------------  Past Medical History:  has no past medical history on file.    Past Surgical History:  has no past surgical history on file.    Social History:      Family History: family history is not on file.     The patient’s home medications have been reviewed.    Allergies: Bactrim [sulfamethoxazole-trimethoprim]    -------------------------------------------------- RESULTS -------------------------------------------------  All laboratory and radiology results have been personally reviewed by myself   LABS:  Results for orders placed or performed during the hospital encounter of 06/11/24   CBC with Auto Differential   Result Value Ref Range    WBC 19.1 (H) 4.5 - 11.5 k/uL    RBC 4.55 3.50 - 5.50 m/uL    Hemoglobin 13.9 11.5 - 15.5 g/dL    Hematocrit 43.1 34.0 - 48.0 %    MCV 94.7 80.0 - 99.9 fL    MCH 30.5 26.0 - 35.0 pg    MCHC 32.3 32.0 - 34.5 g/dL    RDW 13.2 11.5 - 15.0 %    Platelets 170 130 - 450 k/uL    MPV 8.4 7.0 - 12.0 fL    Neutrophils % 42 (L) 43.0 - 80.0 %    Lymphocytes % 45 (H) 20.0 - 42.0 %    Monocytes % 3 2.0 - 12.0 %    Eosinophils % 0 0 - 6 %    Basophils % 0 0.0 - 2.0 %    Atypical Lymphocytes 11 (H) 0 - 4 %    Neutrophils Absolute 8.04 (H) 1.80 - 7.30 k/uL    Lymphocytes Absolute 8.54 (H) 1.50 - 4.00 k/uL    Monocytes Absolute 0.50 0.10 - 0.95 k/uL    Eosinophils Absolute 0.00 (L) 0.05 - 0.50 k/uL    Basophils Absolute 0.00 0.00 - 0.20 k/uL    Atypical  Normocephalic and atraumatic. PERRL, EOMI. Oropharynx clear, handling secretions, no trismus. No raccoon eyes. No bell's sign.   Neck: Supple, full ROM, no cervical spine tenderness.  Pulmonary: Lungs clear to auscultation bilaterally, no wheezes, rales, or rhonchi. Not in respiratory distress.  Cardiovascular:  Regular rate and rhythm, no murmurs, gallops, or rubs. 2+ distal pulses.  Chest: No chest wall tenderness. No crepitus.  Abdomen: Soft, non tender, non distended.  Extremities: Moves all extremities x 4. Warm and well perfused.  Right lower extremity is shortened and externally rotated, tenderness to right hip, decreased range of motion due to pain, pedal pulses intact, no tenderness to distal femur, soft and easily compressible compartments.  Back: No midline thoracic or lumbar tenderness.  Skin: warm and dry without rash.  Neurologic: GCS 15, 5/5 strength in all extremities. Normal sensation in all extremities.  Psych: Normal Affect.  Normal behavior.      ------------------------------ ED COURSE/MEDICAL DECISION MAKING----------------------  Medications   ceFAZolin (ANCEF) 2,000 mg in sterile water 20 mL IV syringe (has no administration in time range)       Medical Decision Making/ED COURSE:    History From: Patient     Patient is a 89 y.o. female presenting to the ED for acute onset fall, moderate in severity. In the ED, patient was hemodynamically stable and afebrile. Labs and imaging obtained. Differential diagnosis includes hip fracture, hip dislocation, contusion.     I reviewed and interpreted labs. Nonspecific leukocytosis of 19.1 (patient appears to chronic). Patient denies fevers or infectious signs or symptoms. CMP reassuring.     Chest xray interpreted by me. Interpretation-lungs clear, no infiltrate. Radiologist confirms read.    Right hip with pelvis xray interpreted by me. Interpretation-femoral neck fracture. Radiologist confirms read.    Ortho consulted. Plan for OR

## 2024-06-11 NOTE — CONSULTS
----- Message from Karen Kumar MD sent at 3/6/2022 11:11 PM EST -----  Liver function have improved slightly hepatitis B and Hep C are negative. I have ordered repeat labs,. You should get it done on Wednesday. Also get ultrasound abdomen done. If you have abdominal pain, nausea, vomiting , please go to ER. Department of Orthopedic Surgery  Resident Consult Note    Reason for Consult: Right Hip Pain    HISTORY OF PRESENT ILLNESS:       Patient is a 89 y.o. female who presents with hip pain after a fall.  Patient very hard of hearing and most of history is obtained from daughter in room.  Apparently patient was shopping today when she suffered a mechanical fall landing on her right hip.  Patient reports immediate pain and discomfort, was subsequently brought to [facility] for evaluation.  Patient is community ambulator without assistance.  Denies distal numbness/tingling/paresthesias, denies any other orthopedic complaints at this time      Past Medical History:    No past medical history on file.  Past Surgical History:    No past surgical history on file.  Current Medications:   No current facility-administered medications for this encounter.  Allergies:  Bactrim [sulfamethoxazole-trimethoprim]    Social History:   TOBACCO:   has no history on file for tobacco use.  ETOH:   has no history on file for alcohol use.  DRUGS:   has no history on file for drug use.  Family History:   No family history on file.    REVIEW OF SYSTEMS:  CONSTITUTIONAL:  negative for  fevers, chills  EYES:  negative for blurred vision, visual disturbance  HEENT:  negative for  hearing loss, voice change  RESPIRATORY:  negative for  dyspnea, wheezing  CARDIOVASCULAR:  negative for  chest pain, palpitations  GASTROINTESTINAL:  negative for nausea, vomiting  GENITOURINARY:  negative for frequency, urinary incontinence  HEMATOLOGIC/LYMPHATIC:  negative for bleeding and petechiae  MUSCULOSKELETAL:  positive for  pain and decreased range of motion  NEUROLOGICAL:  negative for headaches, dizziness  BEHAVIOR/PSYCH:  negative for increased agitation and anxiety    PHYSICAL EXAM:    VITALS:  BP (!) 141/90   Pulse 82   Temp 97.5 °F (36.4 °C)   Resp 23   Wt 61.8 kg (136 lb 3.9 oz)   SpO2 95%   BMI 26.61 kg/m²   CONSTITUTIONAL:  awake, alert,  numbness/tingling/paresthesias, denies any other orthopedic complaints at this time     ROS, medications, allergies, past medical/surgical/social/family histories reviewed and as above    PE:  BP (!) 153/97   Pulse 93   Temp 97.1 °F (36.2 °C) (Temporal)   Resp 16   Ht 1.524 m (5')   Wt 61.8 kg (136 lb 3.9 oz)   SpO2 98%   BMI 26.61 kg/m²   As above    Radiographic Review:  Right femoral neck fracture transcervical, displaced with varus angulation and shortening, demineralized bone matrix    ASSESSMENT:  Right femoral neck fracture pathologic due to underlying osteoporosis    PLAN:  Had lengthy discussion with patient and daughter regarding their diagnosis, typical prognosis, and expected outcomes.   We reviewed the possible complications from the injury itself despite treatment chosen.   We also discussed treatment options including nonoperative managements versus surgical management, along with risks and benefits of each.   Patient and family have elected for surgical management despite associated risks.   Medical admit with surgical optimization  Maintain bedrest, n.p.o.  Tentative OR 6/12/2024 for right hip hemiarthroplasty  I have explained the risks and complications of the recommended surgery with the patient and her daughter at length, as well as discussed potential treatment alternatives including nonoperative management. These risks include but are not limited to death or complication from anesthesia, continued pain, nerve tendon or vascular injury, infection, fracture, dislocation, hip pain, limp, aseptic loosening, heterotopic bone formation, leg length discrepancy, symptomatic hardware or hardware failure, deep vein thrombosis or pulmonary embolism, unforeseen complications, and need for further surgery, etc.  Patient and family understood this, asked appropriate questions, which were all answered, and she has elected to proceed with the procedure.    Electronically signed by   Justen Villalobos,

## 2024-06-11 NOTE — PROGRESS NOTES
4 Eyes Skin Assessment     NAME:  Rosa Isela Weaver  YOB: 1933  MEDICAL RECORD NUMBER:  [id number]    The patient is being assessed for  Admission    I agree that at least one RN has performed a thorough Head to Toe Skin Assessment on the patient. ALL assessment sites listed below have been assessed.      Areas assessed by both nurses:    Head, Face, Ears, Shoulders, Back, Chest, Arms, Elbows, Hands, Sacrum. Buttock, Coccyx, Ischium, Legs. Feet and Heels, and Under Medical Devices         Does the Patient have a Wound? No noted wound(s)       Prabhjot Prevention initiated by RN: No  Wound Care Orders initiated by RN: No    Pressure Injury (Stage 3,4, Unstageable, DTI, NWPT, and Complex wounds) if present, place Wound referral order by RN under : No    New Ostomies, if present place, Ostomy referral order under : No     Nurse 1 eSignature: Electronically signed by Dilma Gray RN on 6/11/24 at 5:06 PM EDT    **SHARE this note so that the co-signing nurse can place an eSignature**    Nurse 2 eSignature: Electronically signed by Nicole Giles RN on 6/11/24 at 5:49 PM EDT

## 2024-06-12 ENCOUNTER — ANESTHESIA (OUTPATIENT)
Dept: OPERATING ROOM | Age: 89
DRG: 522 | End: 2024-06-12
Payer: MEDICARE

## 2024-06-12 ENCOUNTER — ANESTHESIA EVENT (OUTPATIENT)
Dept: OPERATING ROOM | Age: 89
DRG: 522 | End: 2024-06-12
Payer: MEDICARE

## 2024-06-12 ENCOUNTER — APPOINTMENT (OUTPATIENT)
Dept: GENERAL RADIOLOGY | Age: 89
DRG: 522 | End: 2024-06-12
Payer: MEDICARE

## 2024-06-12 LAB
ABO + RH BLD: NORMAL
ARM BAND NUMBER: NORMAL
BLOOD BANK SAMPLE EXPIRATION: NORMAL
BLOOD GROUP ANTIBODIES SERPL: NEGATIVE

## 2024-06-12 PROCEDURE — 6360000002 HC RX W HCPCS: Performed by: ORTHOPAEDIC SURGERY

## 2024-06-12 PROCEDURE — 2580000003 HC RX 258: Performed by: PHYSICAL THERAPY ASSISTANT

## 2024-06-12 PROCEDURE — 3600000015 HC SURGERY LEVEL 5 ADDTL 15MIN: Performed by: ORTHOPAEDIC SURGERY

## 2024-06-12 PROCEDURE — 3700000000 HC ANESTHESIA ATTENDED CARE: Performed by: ORTHOPAEDIC SURGERY

## 2024-06-12 PROCEDURE — 7100000001 HC PACU RECOVERY - ADDTL 15 MIN: Performed by: ORTHOPAEDIC SURGERY

## 2024-06-12 PROCEDURE — 2580000003 HC RX 258: Performed by: ORTHOPAEDIC SURGERY

## 2024-06-12 PROCEDURE — 27236 TREAT THIGH FRACTURE: CPT | Performed by: ORTHOPAEDIC SURGERY

## 2024-06-12 PROCEDURE — 2709999900 HC NON-CHARGEABLE SUPPLY: Performed by: ORTHOPAEDIC SURGERY

## 2024-06-12 PROCEDURE — 1200000000 HC SEMI PRIVATE

## 2024-06-12 PROCEDURE — 0SRR01A REPLACEMENT OF RIGHT HIP JOINT, FEMORAL SURFACE WITH METAL SYNTHETIC SUBSTITUTE, UNCEMENTED, OPEN APPROACH: ICD-10-PCS | Performed by: ORTHOPAEDIC SURGERY

## 2024-06-12 PROCEDURE — 2580000003 HC RX 258: Performed by: INTERNAL MEDICINE

## 2024-06-12 PROCEDURE — 7100000000 HC PACU RECOVERY - FIRST 15 MIN: Performed by: ORTHOPAEDIC SURGERY

## 2024-06-12 PROCEDURE — C1776 JOINT DEVICE (IMPLANTABLE): HCPCS | Performed by: ORTHOPAEDIC SURGERY

## 2024-06-12 PROCEDURE — 3700000001 HC ADD 15 MINUTES (ANESTHESIA): Performed by: ORTHOPAEDIC SURGERY

## 2024-06-12 PROCEDURE — 6360000002 HC RX W HCPCS: Performed by: NURSE ANESTHETIST, CERTIFIED REGISTERED

## 2024-06-12 PROCEDURE — 73502 X-RAY EXAM HIP UNI 2-3 VIEWS: CPT

## 2024-06-12 PROCEDURE — 86901 BLOOD TYPING SEROLOGIC RH(D): CPT

## 2024-06-12 PROCEDURE — 36415 COLL VENOUS BLD VENIPUNCTURE: CPT

## 2024-06-12 PROCEDURE — 2500000003 HC RX 250 WO HCPCS: Performed by: PHYSICAL THERAPY ASSISTANT

## 2024-06-12 PROCEDURE — 2700000000 HC OXYGEN THERAPY PER DAY

## 2024-06-12 PROCEDURE — 6360000002 HC RX W HCPCS: Performed by: PHYSICAL THERAPY ASSISTANT

## 2024-06-12 PROCEDURE — 3600000005 HC SURGERY LEVEL 5 BASE: Performed by: ORTHOPAEDIC SURGERY

## 2024-06-12 PROCEDURE — 86850 RBC ANTIBODY SCREEN: CPT

## 2024-06-12 PROCEDURE — 2500000003 HC RX 250 WO HCPCS: Performed by: ORTHOPAEDIC SURGERY

## 2024-06-12 PROCEDURE — A4217 STERILE WATER/SALINE, 500 ML: HCPCS | Performed by: ORTHOPAEDIC SURGERY

## 2024-06-12 PROCEDURE — 2500000003 HC RX 250 WO HCPCS: Performed by: NURSE ANESTHETIST, CERTIFIED REGISTERED

## 2024-06-12 PROCEDURE — 86900 BLOOD TYPING SEROLOGIC ABO: CPT

## 2024-06-12 DEVICE — V40 FEMORAL HEAD
Type: IMPLANTABLE DEVICE | Site: HIP | Status: FUNCTIONAL
Brand: V40 HEAD

## 2024-06-12 DEVICE — 127 DEGREE NECK ANGLE HIP STEM
Type: IMPLANTABLE DEVICE | Site: HIP | Status: FUNCTIONAL
Brand: ACCOLADE

## 2024-06-12 DEVICE — BIPOLAR COMPONENT
Type: IMPLANTABLE DEVICE | Site: HIP | Status: FUNCTIONAL
Brand: UHR

## 2024-06-12 RX ORDER — VANCOMYCIN HYDROCHLORIDE 1 G/20ML
INJECTION, POWDER, LYOPHILIZED, FOR SOLUTION INTRAVENOUS PRN
Status: DISCONTINUED | OUTPATIENT
Start: 2024-06-12 | End: 2024-06-12 | Stop reason: HOSPADM

## 2024-06-12 RX ORDER — DEXAMETHASONE SODIUM PHOSPHATE 10 MG/ML
INJECTION, SOLUTION INTRAMUSCULAR; INTRAVENOUS PRN
Status: DISCONTINUED | OUTPATIENT
Start: 2024-06-12 | End: 2024-06-12 | Stop reason: SDUPTHER

## 2024-06-12 RX ORDER — ROCURONIUM BROMIDE 10 MG/ML
INJECTION, SOLUTION INTRAVENOUS PRN
Status: DISCONTINUED | OUTPATIENT
Start: 2024-06-12 | End: 2024-06-12 | Stop reason: SDUPTHER

## 2024-06-12 RX ORDER — MEPERIDINE HYDROCHLORIDE 25 MG/ML
12.5 INJECTION INTRAMUSCULAR; INTRAVENOUS; SUBCUTANEOUS EVERY 5 MIN PRN
Status: DISCONTINUED | OUTPATIENT
Start: 2024-06-12 | End: 2024-06-12 | Stop reason: HOSPADM

## 2024-06-12 RX ORDER — NALOXONE HYDROCHLORIDE 0.4 MG/ML
INJECTION, SOLUTION INTRAMUSCULAR; INTRAVENOUS; SUBCUTANEOUS PRN
Status: DISCONTINUED | OUTPATIENT
Start: 2024-06-12 | End: 2024-06-12 | Stop reason: HOSPADM

## 2024-06-12 RX ORDER — SODIUM CHLORIDE 0.9 % (FLUSH) 0.9 %
5-40 SYRINGE (ML) INJECTION PRN
Status: DISCONTINUED | OUTPATIENT
Start: 2024-06-12 | End: 2024-06-12 | Stop reason: HOSPADM

## 2024-06-12 RX ORDER — FENTANYL CITRATE 50 UG/ML
INJECTION, SOLUTION INTRAMUSCULAR; INTRAVENOUS PRN
Status: DISCONTINUED | OUTPATIENT
Start: 2024-06-12 | End: 2024-06-12 | Stop reason: SDUPTHER

## 2024-06-12 RX ORDER — SODIUM CHLORIDE 0.9 % (FLUSH) 0.9 %
5-40 SYRINGE (ML) INJECTION EVERY 12 HOURS SCHEDULED
Status: DISCONTINUED | OUTPATIENT
Start: 2024-06-12 | End: 2024-06-12 | Stop reason: HOSPADM

## 2024-06-12 RX ORDER — TOBRAMYCIN 1.2 G/30ML
INJECTION, POWDER, LYOPHILIZED, FOR SOLUTION INTRAVENOUS PRN
Status: DISCONTINUED | OUTPATIENT
Start: 2024-06-12 | End: 2024-06-12 | Stop reason: HOSPADM

## 2024-06-12 RX ORDER — ASPIRIN 81 MG/1
81 TABLET, CHEWABLE ORAL 2 TIMES DAILY
Status: DISCONTINUED | OUTPATIENT
Start: 2024-06-13 | End: 2024-06-14 | Stop reason: HOSPADM

## 2024-06-12 RX ORDER — ONDANSETRON 2 MG/ML
INJECTION INTRAMUSCULAR; INTRAVENOUS PRN
Status: DISCONTINUED | OUTPATIENT
Start: 2024-06-12 | End: 2024-06-12 | Stop reason: SDUPTHER

## 2024-06-12 RX ORDER — TRANEXAMIC ACID 10 MG/ML
1000 INJECTION, SOLUTION INTRAVENOUS ONCE
Status: COMPLETED | OUTPATIENT
Start: 2024-06-12 | End: 2024-06-12

## 2024-06-12 RX ORDER — SODIUM CHLORIDE 9 MG/ML
INJECTION, SOLUTION INTRAVENOUS PRN
Status: DISCONTINUED | OUTPATIENT
Start: 2024-06-12 | End: 2024-06-12 | Stop reason: HOSPADM

## 2024-06-12 RX ORDER — OXYCODONE HYDROCHLORIDE AND ACETAMINOPHEN 5; 325 MG/1; MG/1
1 TABLET ORAL EVERY 6 HOURS PRN
Qty: 28 TABLET | Refills: 0 | Status: SHIPPED | OUTPATIENT
Start: 2024-06-12 | End: 2024-06-14

## 2024-06-12 RX ORDER — HYDROMORPHONE HYDROCHLORIDE 1 MG/ML
0.5 INJECTION, SOLUTION INTRAMUSCULAR; INTRAVENOUS; SUBCUTANEOUS EVERY 5 MIN PRN
Status: DISCONTINUED | OUTPATIENT
Start: 2024-06-12 | End: 2024-06-12 | Stop reason: HOSPADM

## 2024-06-12 RX ORDER — CEFAZOLIN SODIUM 1 G/3ML
INJECTION, POWDER, FOR SOLUTION INTRAMUSCULAR; INTRAVENOUS PRN
Status: DISCONTINUED | OUTPATIENT
Start: 2024-06-12 | End: 2024-06-12 | Stop reason: SDUPTHER

## 2024-06-12 RX ORDER — PROPOFOL 10 MG/ML
INJECTION, EMULSION INTRAVENOUS PRN
Status: DISCONTINUED | OUTPATIENT
Start: 2024-06-12 | End: 2024-06-12 | Stop reason: SDUPTHER

## 2024-06-12 RX ORDER — ASPIRIN 81 MG/1
81 TABLET ORAL 2 TIMES DAILY
Qty: 30 TABLET | Refills: 0 | Status: SHIPPED | OUTPATIENT
Start: 2024-06-12

## 2024-06-12 RX ORDER — LIDOCAINE HYDROCHLORIDE 20 MG/ML
INJECTION, SOLUTION INTRAVENOUS PRN
Status: DISCONTINUED | OUTPATIENT
Start: 2024-06-12 | End: 2024-06-12 | Stop reason: SDUPTHER

## 2024-06-12 RX ORDER — KETAMINE HCL IN NACL, ISO-OSM 100MG/10ML
SYRINGE (ML) INJECTION PRN
Status: DISCONTINUED | OUTPATIENT
Start: 2024-06-12 | End: 2024-06-12 | Stop reason: SDUPTHER

## 2024-06-12 RX ADMIN — TRANEXAMIC ACID 1000 MG: 10 INJECTION, SOLUTION INTRAVENOUS at 11:50

## 2024-06-12 RX ADMIN — PHENYLEPHRINE HYDROCHLORIDE 100 MCG: 10 INJECTION INTRAVENOUS at 12:23

## 2024-06-12 RX ADMIN — ROCURONIUM BROMIDE 30 MG: 10 INJECTION, SOLUTION INTRAVENOUS at 11:32

## 2024-06-12 RX ADMIN — WATER 2000 MG: 1 INJECTION INTRAMUSCULAR; INTRAVENOUS; SUBCUTANEOUS at 19:58

## 2024-06-12 RX ADMIN — PROPOFOL 100 MG: 10 INJECTION, EMULSION INTRAVENOUS at 11:32

## 2024-06-12 RX ADMIN — Medication 10 MG: at 11:55

## 2024-06-12 RX ADMIN — SODIUM CHLORIDE, PRESERVATIVE FREE 10 ML: 5 INJECTION INTRAVENOUS at 19:59

## 2024-06-12 RX ADMIN — PHENYLEPHRINE HYDROCHLORIDE 100 MCG: 10 INJECTION INTRAVENOUS at 11:48

## 2024-06-12 RX ADMIN — DEXAMETHASONE SODIUM PHOSPHATE 10 MG: 10 INJECTION, SOLUTION INTRAMUSCULAR; INTRAVENOUS at 12:01

## 2024-06-12 RX ADMIN — SUGAMMADEX 200 MG: 100 INJECTION, SOLUTION INTRAVENOUS at 12:41

## 2024-06-12 RX ADMIN — LIDOCAINE HYDROCHLORIDE 100 MG: 20 INJECTION, SOLUTION INTRAVENOUS at 11:32

## 2024-06-12 RX ADMIN — PHENYLEPHRINE HYDROCHLORIDE 200 MCG: 10 INJECTION INTRAVENOUS at 11:35

## 2024-06-12 RX ADMIN — SODIUM CHLORIDE, PRESERVATIVE FREE 10 ML: 5 INJECTION INTRAVENOUS at 08:38

## 2024-06-12 RX ADMIN — Medication 10 MG: at 11:32

## 2024-06-12 RX ADMIN — ONDANSETRON HYDROCHLORIDE 4 MG: 2 SOLUTION INTRAMUSCULAR; INTRAVENOUS at 12:23

## 2024-06-12 RX ADMIN — SODIUM CHLORIDE: 9 INJECTION, SOLUTION INTRAVENOUS at 11:23

## 2024-06-12 RX ADMIN — FENTANYL CITRATE 25 MCG: 50 INJECTION, SOLUTION INTRAMUSCULAR; INTRAVENOUS at 11:32

## 2024-06-12 RX ADMIN — CEFAZOLIN 2 G: 1 INJECTION, POWDER, FOR SOLUTION INTRAMUSCULAR; INTRAVENOUS at 11:40

## 2024-06-12 RX ADMIN — ROCURONIUM BROMIDE 10 MG: 10 INJECTION, SOLUTION INTRAVENOUS at 12:10

## 2024-06-12 ASSESSMENT — PAIN SCALES - GENERAL
PAINLEVEL_OUTOF10: 0
PAINLEVEL_OUTOF10: 0

## 2024-06-12 NOTE — DISCHARGE INSTRUCTIONS
[facility] Department of Orthopedic Surgery  [address] [facility] [address]    Dr. Justen Villalobos, DO         MD Dr. Justen Pritchard MD Frank Ansevin, PA-C Sara Zatchok, PA-C Tyler Tsangaris PA-C      Orthopaedics Discharge Instructions   Weight bearing Status - Weight bearing as tolerated - on right lower Extremity  Pain Medication   Contact Office for Medication Refill- [phone]  Office can refill pain medications no sooner than every 7 days  If patient discharging to facility then pain control will be continued per facility physician  Ice to operative/injured site for 15-30 minutes of each hour for next 5 days    Recommend that you continue to ice the area 2-3 times per day after this   Elevate operative/injured limb on 2 pillows at home  Goal is to have limb above the heart if able  Continue DVT Prophylaxis (blood clot prevention) as prescribed: aspirin 325 mg BID   Wound care - Can take off the dressing at the surgical site seven days after the date of surgery. Can just peel off. After, do daily dressing changes as needed until the drainage from the surgical site ceases    Follow up in office in approximately 2 weeks. Your first follow up appointment is often with one of our physician assistants.     Call the office at [phone] if having any concerns.     Watch for these significant complications.  Call physician if they or any other problems occur:  Fever over 101°, redness, swelling or warmth at the operative site  Unrelieved nausea    Foul smelling or cloudy drainage at the operative site   Unrelieved pain    Blood soaked dressing. (Some oozing may be normal)     Numb, pale, blue, cold or tingling extremity          It is the Department of Orthopaedic Trauma's standard of practice that providers will de-escalate (wean) all patients from narcotic (opioid) medications during the post-operative period.   We provide multimodal pain control, but opioid medications are

## 2024-06-12 NOTE — PROGRESS NOTES
Pt verified using 2 Identifiers and ID band with OR staff prior to acceptance to PACU/Phase II care.

## 2024-06-12 NOTE — ANESTHESIA PRE PROCEDURE
Department of Anesthesiology  Preprocedure Note       Name:  Rosa Isela Weaver   Age:  89 y.o.  :  1933                                          MRN:  [id number]         Date:  2024      Surgeon: Surgeon(s):  Justen Villalobos DO    Procedure: Procedure(s):  RIGHT HIP HEMIARTHROPLASTY    Medications prior to admission:   Prior to Admission medications    Medication Sig Start Date End Date Taking? Authorizing Provider   ondansetron (ZOFRAN-ODT) 4 MG disintegrating tablet Take 1 tablet by mouth every 8 hours as needed for Nausea or Vomiting 24   Errol Durham MD   Multiple Vitamins-Minerals (WOMENS 50+ MULTI VITAMIN) TABS Take 1 tablet by mouth every morning    ProviderJeaneth MD   Calcium-Vitamin D (CALTRATE 600 PLUS-VIT D PO) Take 1 tablet by mouth every morning    Provider, MD Jeaneth       Current medications:    Current Facility-Administered Medications   Medication Dose Route Frequency Provider Last Rate Last Admin   • tranexamic acid-NaCl IVPB premix 1,000 mg  1,000 mg IntraVENous Once Von Stinson, DO       • sodium chloride flush 0.9 % injection 5-40 mL  5-40 mL IntraVENous 2 times per day Osvaldo Craig, DO   10 mL at 24 0838   • sodium chloride flush 0.9 % injection 5-40 mL  5-40 mL IntraVENous PRN Osvaldo Craig DO       • 0.9 % sodium chloride infusion   IntraVENous PRN Osvaldo Craig, DO       • potassium chloride (KLOR-CON M) extended release tablet 40 mEq  40 mEq Oral PRN Osvaldo Craig DO        Or   • potassium bicarb-citric acid (EFFER-K) effervescent tablet 40 mEq  40 mEq Oral PRN Osvaldo Craig, DO        Or   • potassium chloride 10 mEq/100 mL IVPB (Peripheral Line)  10 mEq IntraVENous PRN Osvaldo Craig DO       • magnesium sulfate 2000 mg in 50 mL IVPB premix  2,000 mg IntraVENous PRN Osvaldo Craig DO       • ondansetron (ZOFRAN-ODT) disintegrating tablet 4 mg  4 mg Oral Q8H PRN Osvaldo Craig DO        Or   • 
Abdomen is mildly obese, soft, and non tender. Bowel sounds are normal. No rebound tenderness or guarding. Negative Medel's sign.

## 2024-06-12 NOTE — OP NOTE
Operative Note      Patient: Rosa Isela Weaver  YOB: 1933  MRN: [id number]    Date of Procedure: 6/12/2024    Pre-Op Diagnosis Codes:  Right femoral neck fracture    Post-Op Diagnosis: Same       Procedure(s):  RIGHT HIP HEMIARTHROPLASTY    Surgeon(s):  Justen Villalobos DO    Assistant:   Resident: Von Stinson DO; Ross Hill DO    Anesthesia: General    Estimated Blood Loss (mL): 100    Complications: None    Specimens:   * No specimens in log *    Implants:  Implant Name Type Inv. Item Serial No.  Lot No. LRB No. Used Action   HEAD FEM 0 26 MM HIP VIT COCR V40 ABG II MOD - [id number]  HEAD FEM 0 26 MM HIP VIT COCR V40 ABG II MOD  [employer] Novel [employer] [id number] Right [id number] Implanted   HEAD FEM OD44MM ID26MM HIP CO CHROM POLYETH BPLR CEMENTLESS - [id number]  HEAD FEM OD44MM ID26MM HIP CO CHROM POLYETH BPLR CEMENTLESS  [employer] Novel [employer] [id number] Right 1 Implanted   STEM FEM SZ 3 L102MM NK L30MM 38MM OFFSET 127DEG HIP TI - [id number]  STEM FEM SZ 3 L102MM NK L30MM 38MM OFFSET 127DEG HIP TI  [employer] Novel [employer] [id number] Right 1 Implanted         Drains:   External Urinary Catheter (Active)   Site Assessment Intact 06/11/24 2330   Placement Repositioned 06/11/24 2330   Catheter Care Catheter/Wick replaced 06/11/24 2330   Perineal Care Yes 06/11/24 2300   Suction 40 mmgHg continuous 06/11/24 2330   Urine Color Yellow 06/11/24 2330   Urine Appearance Clear 06/11/24 2330   Urine Odor Malodorous 06/11/24 2330   Output (mL) 400 mL 06/11/24 2300       Findings:  Infection Present At Time Of Surgery (PATOS) (choose all levels that have infection present):  No infection present  Other Findings: see details below    Detailed Description of Procedure:   Patient was seen and identified in the preoperative area where the appropriate site and procedure was confirmed by the patient, anesthesia, staff, and surgeon.  Patient was then wheeled to the operative suite where

## 2024-06-12 NOTE — PLAN OF CARE
Problem: Discharge Planning  Goal: Discharge to home or other facility with appropriate resources  Outcome: Not Progressing     Problem: Pain  Goal: Verbalizes/displays adequate comfort level or baseline comfort level  Outcome: Progressing     Problem: Skin/Tissue Integrity  Goal: Absence of new skin breakdown  Description: 1.  Monitor for areas of redness and/or skin breakdown  2.  Assess vascular access sites hourly  3.  Every 4-6 hours minimum:  Change oxygen saturation probe site  4.  Every 4-6 hours:  If on nasal continuous positive airway pressure, respiratory therapy assess nares and determine need for appliance change or resting period.  Outcome: Progressing     Problem: ABCDS Injury Assessment  Goal: Absence of physical injury  Outcome: Progressing     Problem: Safety - Adult  Goal: Free from fall injury  Outcome: Progressing     Problem: Musculoskeletal - Adult  Goal: Return mobility to safest level of function  Outcome: Not Progressing  Goal: Maintain proper alignment of affected body part  Outcome: Progressing  Goal: Return ADL status to a safe level of function  Outcome: Not Progressing     Problem: Genitourinary - Adult  Goal: Absence of urinary retention  Outcome: Progressing

## 2024-06-12 NOTE — CARE COORDINATION
6/12/2024Social work transition of care planning  Assessment completed with pt and dtr at bedside. Pt is [place]. Pt is from home alone. Pt reportedly has w/w,bsc, and s/c in the home. Pt pcp is Dr. Gramajo and pharmacy is [facility]. Pt has snf  hx of [facility]-would like again. Sw made referral to [place]. Explained sw role in transition of car planning. Pt for sx today. Sw left gabbi list,if needed.  The Plan for Transition of Care is related to the following treatment goals: skilled    The Patient and/or patient representative  was provided with a choice of provider and agrees   with the discharge plan. [x] Yes [] No    Freedom of choice list was provided with basic dialogue that supports the patient's individualized plan of care/goals, treatment preferences and shares the quality data associated with the providers. [x] Yes [] No  Electronically signed by EDGAR Gudino on 6/12/2024 at 9:48 AM

## 2024-06-12 NOTE — PROGRESS NOTES
CLINICAL PHARMACY NOTE: MEDS TO BEDS    Total # of Prescriptions Filled: 2   The following medications were delivered to the patient:  Aspirin low 81 mg  Oxycodone/apap 5-325 mg    Additional Documentation:   Daughter picked up in pharmacy

## 2024-06-12 NOTE — H&P
[facility]              [address] [place] [address]                           HISTORY & PHYSICAL      PATIENT NAME: STACY CRAWFORD           : 1933  MED REC NO: [id number]                        ROOM: 5420  ACCOUNT NO: [id number]                       ADMIT DATE: 2024  PROVIDER: Osvaldo Craig DO      PRIMARY CARE PHYSICIAN:  Dr. Gramajo    CHIEF COMPLAINT:  Status post fall, right hip pain.    HISTORY OF PRESENT ILLNESS:  The patient is a 89-year-old  female who presented to the emergency room after a fall.  Diagnostic evaluation revealed right femur fracture.  She was admitted for further evaluation and treatment.    PAST MEDICAL HISTORY:  Hard of hearing.    PAST SURGICAL HISTORY:  Unknown.    MEDICATIONS:  Prior to admission:  Calcium with vitamin D, multivitamins, and Zofran p.r.n.    REVIEW OF SYSTEMS:  Remarkable for above-stated chief complaint plus hard of hearing.    ALLERGIES:  TO BACTRIM.      SOCIAL HISTORY:  No tobacco, no alcohol.    PHYSICAL EXAMINATION:  GENERAL:  Reveals a 89-year-old  female who is alert, cooperative, and a poor historian.  She is severely hard of hearing.  She does not have her hearing aids in.  VITAL SIGNS:  On admission:  Temperature 97.5, pulse 101, respirations 18, and blood pressure 180/99 and repeat 160/98.  HEAD:  Normocephalic, atraumatic.  Eyes, pupils equal and reactive to light.  Extraocular muscles intact.  Fundi not well visualized.  NOSE:  No obstruction, polyp, or discharge noted.  MOUTH:  Mucosa without lesion.  PHARYNX:  Noninjected without exudate.  NECK:  Supple.  No JVD.  No thyromegaly.  No carotid bruits.  HEART:  Regular rate and rhythm without murmur.  LUNGS:  Clear to auscultation bilaterally.  ABDOMEN:  Positive bowel sounds.  Soft, nontender.  No rebound or guarding.  No hepatosplenomegaly.  No masses.  BACK:  With increased thoracic kyphosis.  EXTREMITIES:   There is external rotation of the right lower extremity.  There are bilateral lower extremity varicose veins.  LYMPH NODES:  No adenopathy noticed.  SKIN:  Without rash or lesion.    IMPRESSION:  Right femoral neck fracture, hard of hearing, and leukocytosis.    PLAN:    1. Admit.  2. Ortho to see.  3. Pain control.  4. Bedrest.  5. Discharge plan, to home when stable.          JAYCE DOUGLAS DO      D:  06/12/2024 07:21:35     T:  06/12/2024 08:09:22     CHAMP/FLORINDA  Job #:  [id number]     Doc#:  [id number]

## 2024-06-12 NOTE — PROGRESS NOTES
Current surgical consent form will not print d/t some technical glitch. Printing prior form off [employer] to have patient sign.

## 2024-06-12 NOTE — ANESTHESIA POSTPROCEDURE EVALUATION
Department of Anesthesiology  Postprocedure Note    Patient: Rosa Isela Weaver  MRN: [id number]  YOB: 1933  Date of evaluation: 6/12/2024    Procedure Summary       Date: 06/12/24 Room / Location: [address]    Anesthesia Start: 1123 Anesthesia Stop:     Procedure: RIGHT HIP HEMIARTHROPLASTY (Right) Diagnosis:       Closed fracture of shaft of right femur, unspecified fracture morphology, initial encounter ([facility])      (Closed fracture of shaft of right femur, unspecified fracture morphology, initial encounter ([facility]) [S72.301A])    Surgeons: Justen Villalobos DO Responsible Provider: Kwaku Gr MD    Anesthesia Type: general ASA Status: 3            Anesthesia Type: General    Kesha Phase I:      Kesha Phase II:      Anesthesia Post Evaluation    Patient location during evaluation: PACU  Patient participation: complete - patient participated  Level of consciousness: awake  Pain score: 3  Airway patency: patent  Nausea & Vomiting: no nausea and no vomiting  Cardiovascular status: blood pressure returned to baseline  Respiratory status: acceptable  Hydration status: euvolemic      No notable events documented.

## 2024-06-13 LAB
ANION GAP SERPL CALCULATED.3IONS-SCNC: 15 MMOL/L (ref 7–16)
BUN SERPL-MCNC: 28 MG/DL (ref 6–23)
CALCIUM SERPL-MCNC: 8.9 MG/DL (ref 8.6–10.2)
CHLORIDE SERPL-SCNC: 108 MMOL/L (ref 98–107)
CO2 SERPL-SCNC: 17 MMOL/L (ref 22–29)
CREAT SERPL-MCNC: 1 MG/DL (ref 0.5–1)
ERYTHROCYTE [DISTWIDTH] IN BLOOD BY AUTOMATED COUNT: 13.2 % (ref 11.5–15)
GFR, ESTIMATED: 56 ML/MIN/1.73M2
GLUCOSE SERPL-MCNC: 130 MG/DL (ref 74–99)
HCT VFR BLD AUTO: 38.5 % (ref 34–48)
HGB BLD-MCNC: 13 G/DL (ref 11.5–15.5)
MCH RBC QN AUTO: 32.1 PG (ref 26–35)
MCHC RBC AUTO-ENTMCNC: 33.8 G/DL (ref 32–34.5)
MCV RBC AUTO: 95.1 FL (ref 80–99.9)
PLATELET # BLD AUTO: 157 K/UL (ref 130–450)
PMV BLD AUTO: 8.8 FL (ref 7–12)
POTASSIUM SERPL-SCNC: 4.4 MMOL/L (ref 3.5–5)
RBC # BLD AUTO: 4.05 M/UL (ref 3.5–5.5)
SODIUM SERPL-SCNC: 140 MMOL/L (ref 132–146)
WBC OTHER # BLD: 27.4 K/UL (ref 4.5–11.5)

## 2024-06-13 PROCEDURE — 97161 PT EVAL LOW COMPLEX 20 MIN: CPT

## 2024-06-13 PROCEDURE — 1200000000 HC SEMI PRIVATE

## 2024-06-13 PROCEDURE — 97535 SELF CARE MNGMENT TRAINING: CPT

## 2024-06-13 PROCEDURE — 97165 OT EVAL LOW COMPLEX 30 MIN: CPT

## 2024-06-13 PROCEDURE — 6370000000 HC RX 637 (ALT 250 FOR IP): Performed by: PHYSICAL THERAPY ASSISTANT

## 2024-06-13 PROCEDURE — 2580000003 HC RX 258: Performed by: PHYSICAL THERAPY ASSISTANT

## 2024-06-13 PROCEDURE — 6360000002 HC RX W HCPCS: Performed by: PHYSICAL THERAPY ASSISTANT

## 2024-06-13 PROCEDURE — 97530 THERAPEUTIC ACTIVITIES: CPT

## 2024-06-13 PROCEDURE — 85027 COMPLETE CBC AUTOMATED: CPT

## 2024-06-13 PROCEDURE — 36415 COLL VENOUS BLD VENIPUNCTURE: CPT

## 2024-06-13 PROCEDURE — 80048 BASIC METABOLIC PNL TOTAL CA: CPT

## 2024-06-13 RX ADMIN — HYDROCODONE BITARTRATE AND ACETAMINOPHEN 1 TABLET: 5; 325 TABLET ORAL at 09:18

## 2024-06-13 RX ADMIN — SODIUM CHLORIDE, PRESERVATIVE FREE 10 ML: 5 INJECTION INTRAVENOUS at 09:19

## 2024-06-13 RX ADMIN — WATER 2000 MG: 1 INJECTION INTRAMUSCULAR; INTRAVENOUS; SUBCUTANEOUS at 04:00

## 2024-06-13 RX ADMIN — ASPIRIN 81 MG 81 MG: 81 TABLET ORAL at 09:07

## 2024-06-13 RX ADMIN — SODIUM CHLORIDE, PRESERVATIVE FREE 10 ML: 5 INJECTION INTRAVENOUS at 21:00

## 2024-06-13 RX ADMIN — ASPIRIN 81 MG 81 MG: 81 TABLET ORAL at 20:47

## 2024-06-13 RX ADMIN — WATER 2000 MG: 1 INJECTION INTRAMUSCULAR; INTRAVENOUS; SUBCUTANEOUS at 11:57

## 2024-06-13 ASSESSMENT — PAIN SCALES - WONG BAKER
WONGBAKER_NUMERICALRESPONSE: NO HURT
WONGBAKER_NUMERICALRESPONSE: NO HURT

## 2024-06-13 ASSESSMENT — PAIN DESCRIPTION - DESCRIPTORS: DESCRIPTORS: PRESSURE;PENETRATING;JABBING

## 2024-06-13 ASSESSMENT — PAIN SCALES - GENERAL
PAINLEVEL_OUTOF10: 5
PAINLEVEL_OUTOF10: 0

## 2024-06-13 ASSESSMENT — PAIN - FUNCTIONAL ASSESSMENT: PAIN_FUNCTIONAL_ASSESSMENT: PREVENTS OR INTERFERES SOME ACTIVE ACTIVITIES AND ADLS

## 2024-06-13 ASSESSMENT — PAIN DESCRIPTION - ORIENTATION: ORIENTATION: RIGHT

## 2024-06-13 NOTE — DISCHARGE INSTR - COC
Continuity of Care Form    Patient Name: Rosa Isela Weaver   :  1933  MRN:  [id number]    Admit date:  2024  Discharge date:  24    Code Status Order: Full Code   Advance Directives:   Advance Care Flowsheet Documentation       Date/Time Healthcare Directive Type of Healthcare Directive Copy in Chart Healthcare Agent Appointed Healthcare Agent's Name Healthcare Agent's Phone Number    24 0026 No, patient does not have an advance directive for healthcare treatment -- -- -- -- --            Admitting Physician:  Osvaldo Craig DO  PCP: Lew Gramajo MD    Discharging Nurse: ***  Discharging Hospital Unit/Room#: 5420/5420-A  Discharging Unit Phone Number: [phone]    Emergency Contact:   Extended Emergency Contact Information  Primary Emergency Contact: Padmini Torres   [facility]  Home Phone: [phone]  Mobile Phone: [phone]  Relation: Child  Secondary Emergency Contact: Osiris Castro   [facility]  Home Phone: [phone]  Relation: Child    Past Surgical History:  Past Surgical History:   Procedure Laterality Date    HIP SURGERY Right 2024    RIGHT HIP HEMIARTHROPLASTY performed by Justen Villalobos DO at [facility] OR       Immunization History:   Immunization History   Administered Date(s) Administered    COVID-19, PFIZER PURPLE top, DILUTE for use, (age 12 y+), 30mcg/0.3mL 2021, 2021       Active Problems:  Patient Active Problem List   Diagnosis Code    Acute nasopharyngitis J00    Pericardial effusion I31.39    Coronavirus infection B34.2    Lethargic R53.83    Constipation K59.00    Closed fracture of neck of right femur, initial encounter ([facility]) S72.001A       Isolation/Infection:   Isolation            No Isolation          Patient Infection Status       None to display                     Nurse Assessment:  Last Vital Signs: /63   Pulse 90   Temp 97.7 °F (36.5 °C) (Temporal)   Resp 18   Ht 1.524 m (5')   Wt 61.8 kg  days post op- 6/19    Patient's personal belongings (please select all that are sent with patient):  Pts family has her hearing aids    RN SIGNATURE:  Electronically signed by Tiffany Anglin RN on 6/14/24 at 9:26 AM EDT    CASE MANAGEMENT/SOCIAL WORK SECTION    Inpatient Status Date: ***    Readmission Risk Assessment Score:  Readmission Risk              Risk of Unplanned Readmission:  11           Discharging to Facility/ Agency   Name: [facility]  Address:  Phone:  Fax:    Dialysis Facility (if applicable)   Name:  Address:  Dialysis Schedule:  Phone:  Fax:    / signature: Electronically signed by Irasema Lewis RN on 6/13/24 at 4:29 PM EDT    PHYSICIAN SECTION    Prognosis: {Prognosis:2879917818}    Condition at Discharge: { Patient Condition:383757991}    Rehab Potential (if transferring to Rehab): {Prognosis:7092916135}    Recommended Labs or Other Treatments After Discharge: ***    Physician Certification: I certify the above information and transfer of Rosa Isela Weaver  is necessary for the continuing treatment of the diagnosis listed and that she requires Skilled Nursing Facility for less 30 days.     Update Admission H&P: {CHP DME Changes in HandP:565707260}    PHYSICIAN SIGNATURE:  {Esignature:[id number]}Electronically signed by Osvaldo Craig DO on 6/14/2024 at 9:16 AM

## 2024-06-13 NOTE — PROGRESS NOTES
OCCUPATIONAL THERAPY INITIAL EVALUATION    [facility] [address] [place], [place]      Date:2024                                                Patient Name: Rosa Isela Weaver  MRN: [id number]  : 1933  Room: [address] [place] [address]     Evaluating OT:Hawa Espinoza, OTR/L   License #  [id number]       Referring Provider: Osvaldo Craig DO     Specific Provider Orders/Date: OT evaluation & treatment        Diagnosis: Closed fracture of neck of right femur, initial encounter ([facility]) [S72.001A]      Pertinent Medical History:  has no past medical history on file.    Surgery: S/P right hip hemiarthroplasty     Past Surgical History:  has a past surgical history that includes hip surgery (Right, 2024).       Precautions:  Fall Risk, WBAT R LE, anterolateral R hip prec., VERY [place]-use written communication board, TSM, +alarm      Assessment of current deficits    [x] Functional mobility            [x]ADLs           [x] Strength                  [x]Cognition    [x] Functional transfers          [x] IADLs         [x] Safety Awareness   [x]Endurance    [x] Fine Coordination                         [x] Balance      [] Vision/perception   [x]Sensation      []Gross Motor Coordination             [] ROM           [] Delirium                   [] Motor Control      OT PLAN OF CARE   OT POC based on physician orders, patient diagnosis and results of clinical assessment     Frequency/Duration: 2-4 days/wk for 2 weeks PRN   Specific OT Treatment Interventions to include:   Instruction/training on adapted ADL techniques and AE recommendations to increase functional independence within precautions  Training on energy conservation strategies, correct breathing pattern and techniques to improve independence/tolerance for self-care routine  Functional transfer/mobility training/DME recommendations for increased independence, safety, and fall  prevention  Patient/Family education to increase follow through with safety techniques and functional independence  Recommendation of environmental modifications for increased safety with functional transfers/mobility and ADLs  Cognitive retraining/development of therapeutic activities to improve problem solving, judgement, memory, and attention for increased safety/participation in ADL/IADL tasks  Visual-perceptual training to improve environmental scanning, visual attention/focus, and oculomotor skills for increased safety/independence with functional transfers/mobility and ADLs  Splinting/positioning for increased function, prevention of contractures, and improve skin integrity  Therapeutic exercise to improve motor endurance, ROM, and functional strength for ADLs/functional transfers  Therapeutic activities to facilitate/challenge dynamic balance, stand tolerance for increased safety and independence with ADLs  Therapeutic activities to facilitate gross/fine motor skills for increased independence with ADLs  Positioning to improve skin integrity, interaction with environment and functional independence     Recommended Adaptive Equipment:  TBD      Home Living: Pt lives alone in a 1 story with no steps to enter with no HR.  B&B on main level.  Bathroom setup: walk in shower   Equipment owned: ww     Prior Level of Function: Pt. states Ind. with ADLs , Ind. with IADLs; ambulated without A.D. in the home, ww in community  Driving: NA  Occupation: retired, from [employer]     Pain Level: none at rest; min/mod once up/ ax.  Cognition: A&O: 3-4/4; Follows 2 step directions              Memory:  F/+              Sequencing:  F+              Problem solving:  F+              Judgement/safety:  F with cues for technique & precautions                Functional Assessment:  AM-PAC Daily Activity Raw Score: 14/24    Initial Eval Status  Date: 6-13-24 Treatment Status  Date: STGs = LTGs  Time frame: 10-14 days   Feeding Set up

## 2024-06-13 NOTE — CARE COORDINATION
Transition of are update. Call to Kisha regarding acceptance for [facility] referral. PT 14 with 30 ft ambulating with WW.  OT is 14. Await response. Per internal med note, cont post op care. Hematology to see re leukocytosis r/o primary hematologic disorder. WBC 27.4. Follows with Dr Alvarado, history of CLL. CM will follow.  Electronically signed by Loc Carreon RN on 6/13/2024 at 3:49 PM    Addendum:  Accepted at [facility]. Will Start precert per liaison in am. MB

## 2024-06-13 NOTE — PROGRESS NOTES
Spoke with the [facility] said that she is a patient of Dr Alvarado's and he sees patient for CLL and they know about her elevated WBCs. She has a follow up appointment on August 28th.

## 2024-06-13 NOTE — PLAN OF CARE
Problem: Discharge Planning  Goal: Discharge to home or other facility with appropriate resources  Outcome: Progressing     Problem: Pain  Goal: Verbalizes/displays adequate comfort level or baseline comfort level  Outcome: Progressing     Problem: Skin/Tissue Integrity  Goal: Absence of new skin breakdown  Description: 1.  Monitor for areas of redness and/or skin breakdown  2.  Assess vascular access sites hourly  3.  Every 4-6 hours minimum:  Change oxygen saturation probe site  4.  Every 4-6 hours:  If on nasal continuous positive airway pressure, respiratory therapy assess nares and determine need for appliance change or resting period.  Outcome: Progressing     Problem: ABCDS Injury Assessment  Goal: Absence of physical injury  Outcome: Progressing     Problem: Safety - Adult  Goal: Free from fall injury  Outcome: Progressing     Problem: Musculoskeletal - Adult  Goal: Return mobility to safest level of function  Outcome: Progressing     Problem: Musculoskeletal - Adult  Goal: Maintain proper alignment of affected body part  Outcome: Progressing     Problem: Musculoskeletal - Adult  Goal: Return ADL status to a safe level of function  Outcome: Progressing     Problem: Genitourinary - Adult  Goal: Absence of urinary retention  Outcome: Progressing

## 2024-06-13 NOTE — PROGRESS NOTES
Department of Orthopedic Surgery  Resident Progress Note    POD #2 status post right hip hemiarthroplasty.  Patient seen and examined, appears confused at baseline and is hard of hearing.  No acute complaints overnight, resting comfortably in bed.    VITALS:  BP 97/67   Pulse 100   Temp 96.8 °F (36 °C) (Temporal)   Resp 18   Ht 1.524 m (5')   Wt 61.8 kg (136 lb 3.9 oz)   SpO2 96%   BMI 26.61 kg/m²     General: well-developed and well nourished, in no acute distress, and alert    MUSCULOSKELETAL:   right lower extremity:  Dressing in place, clean dry and intact  Thigh/lower leg compartments soft compressible  Calf soft and nontender  Positive plantarflexion, dorsiflexion, great toe extension  2+/4 DP and PT pulse, foot warm well-perfused  Distal sensation grossly intact superficial and deep peroneal, tibial, sural, saphenous and    CBC:   Lab Results   Component Value Date/Time    WBC 27.4 06/13/2024 07:34 AM    HGB 13.0 06/13/2024 07:34 AM    HCT 38.5 06/13/2024 07:34 AM     06/13/2024 07:34 AM     PT/INR:    Lab Results   Component Value Date/Time    PROTIME 11.4 06/11/2024 11:46 AM    INR 1.1 06/11/2024 11:46 AM       ASSESSMENT  S/P right hip hemiarthroplasty 6/12    PLAN      Continue physical therapy and protocol: WBAT -right LE  24 hour abx coverage completed  Deep venous thrombosis prophylaxis -aspirin 81 mg twice daily, early mobilization  PT/OT  Pain Control: IV and PO, wean to p.o.  Monitor H&H  D/C Plan: Pending PT OT, social work, primary.  Patient appropriate for discharge from orthopedic surgery standpoint, follow-up outpatient with Dr. Villalobos 2 weeks. Orthopedics will follow the patient peripherally for the remainder of their inpatient stay. Please call with questions or concerns.    Electronically signed by Rigoberto Scott DO on 6/13/2024 at 8:51 AM

## 2024-06-13 NOTE — PROGRESS NOTES
Physical Therapy  Physical Therapy Initial Assessment     Name: Rosa Isela eWaver  : 1933  MRN: [id number]      Date of Service: 2024    Evaluating PT:  Joy Solis PT, DPT [id number]    Room #:  5420/5420-A  Diagnosis:  Closed fracture of neck of right femur, initial encounter ([facility]) [S72.001A]  PMHx/PSHx:  No past medical history on file.   Procedure/Surgery:  RIGHT HIP HEMIARTHROPLASTY   Precautions:  Falls, WBAT RLE, anterolateral hip precautions, severe [place] - communicates via writing, TSM  Equipment Needs:  TBD, pt has WW    SUBJECTIVE:    Pt lives alone in a 1 story home with level entry.  Pt ambulated with WW in community and no AD in the home PTA.    OBJECTIVE:   Initial Evaluation  Date: 24 Treatment Short Term/ Long Term   Goals   AM-PAC 6 Clicks      Was pt agreeable to Eval/treatment? yes     Does pt have pain? R hip pain with mobility     Bed Mobility  Rolling: ModA  Supine to sit: ModA  Sit to supine: ModA  Scooting: ModA  Rolling: Supervision   Supine to sit: Supervision   Sit to supine: Supervision   Scooting: Supervision    Transfers Sit to stand: Kimberly  Stand to sit: Kimberly  Stand pivot: Kimberly with WW  Sit to stand: Supervision   Stand to sit: Supervision .  Stand pivot: Supervision with WW   Ambulation    15 feet and 30 feet with WW Kimberly  >150 feet with WW Supervision    Stair negotiation: ascended and descended  NT  TBD   ROM BUE:  Defer to OT note  BLE:  RLE NT, LLE WFL     Strength BUE:  Defer to OT note  BLE:  grossly 3/5  WFL   Balance Sitting EOB:  SBA  Dynamic Standing:  Kimberly with WW  Sitting EOB:  Supervision   Dynamic Standing:  Supervision with WW     Pt is A & O x 4  Sensation:  denies abnormalities  Edema:  none noted      Patient education  Pt educated on role of PT, safety during mobility    Patient response to education:   Pt verbalized understanding Pt demonstrated skill Pt requires further education in this area   yes yes yes      ASSESSMENT:    Conditions Requiring Skilled Therapeutic Intervention:    [x]Decreased strength     [x]Decreased ROM  [x]Decreased functional mobility  [x]Decreased balance   [x]Decreased endurance   []Decreased posture  []Decreased sensation  []Decreased coordination   []Decreased vision  []Decreased safety awareness   []Increased pain       Comments:  pt semi-supine in bed upon entry and agreeable to PT evaluation. Pt able to complete bed mobility with assist to trunk and BLEs. Pt sat EOB for approximately 5 minutes during session with no assist for sitting balance. Pt able to stand and ambulate to restroom in room with WW and good compliance to anterolateral hip precautions. Time provided for pt to void in seated. Pt attempts to abandon WW in restroom. Further ambulation in room with similar assist. Pt returned to bed at end of session per RN request.   All needs met and call light in reach. All lines remained intact.     Patient to benefit from continued skilled PT at [facility] to improve functional mobility and decrease fall risk.     Treatment:  Patient practiced and was instructed in the following treatment:    Bed Mobility: VCs provided for sequencing and safety during mobility. Manual assist provided for completion of task.  Transfer Training: Verbal and tactile cueing provided for sequencing and safety during mobility. Manual assist provided for completion of task  Gait Training: Ambulation with WW and verbal cues for proper technique and safety. Manual assist provided for completion of task     Pt's/ family goals   1. None stated    Prognosis is good for reaching above PT goals.    Patient and or family understand(s) diagnosis, prognosis, and plan of care.  yes    PHYSICAL THERAPY PLAN OF CARE:    PT POC is established based on physician order and patient diagnosis     Referring provider/PT Order:    06/13/24 0730  PT eval and treat  Start:  06/13/24 0730,   End:  06/13/24 0730,   ONE TIME,   Standing Count:   1 MAURICIO Diaz Michael M, DO     Diagnosis:  Closed fracture of neck of right femur, initial encounter ([facility]) [S72.001A]  Specific instructions for next treatment:  progress mobility as appropriate     Current Treatment Recommendations:     [x] Strengthening to improve independence with functional mobility   [x] ROM to improve independence with functional mobility   [x] Balance Training to improve static/dynamic balance and to reduce fall risk  [x] Endurance Training to improve activity tolerance during functional mobility   [x] Transfer Training to improve safety and independence with all functional transfers   [x] Gait Training to improve gait mechanics, endurance and assess need for appropriate assistive device  [] Stair Training in preparation for safe discharge home and/or into the community   [] Positioning to prevent skin breakdown and contractures  [x] Safety and Education Training   [x] Patient/Caregiver Education   [] HEP  [x] Other     PT long term treatment goals are located in above grid    Frequency of treatments: 2-5x/week x 1-2 weeks.    Time in  0901  Time out  0928    Total Treatment Time  10 minutes     Evaluation Time includes thorough review of current medical information, gathering information on past medical history/social history and prior level of function, completion of standardized testing/informal observation of tasks, assessment of data and education on plan of care and goals.    CPT codes:  [x] Low Complexity PT evaluation 80699  [] Moderate Complexity PT evaluation 89578  [] High Complexity PT evaluation 35051  [] PT Re-evaluation 75304  [] Gait training 13508 - minutes  [] Manual therapy 29131 - minutes  [x] Therapeutic activities 63357 10 minutes  [] Therapeutic exercises 32808 - minutes  [] Neuromuscular reeducation 19356 - minutes     Joy Solis PT, DPT  [id number]

## 2024-06-13 NOTE — PROGRESS NOTES
[facility] Medicine    Subjective:  pt pod # 1 alert conversive signinficant hard of hearing      Current Facility-Administered Medications:     aspirin chewable tablet 81 mg, 81 mg, Oral, BID, Von Stinson, DO, 81 mg at 06/13/24 0907    sodium chloride flush 0.9 % injection 5-40 mL, 5-40 mL, IntraVENous, 2 times per day, Von Stinson, DO, 10 mL at 06/13/24 0919    sodium chloride flush 0.9 % injection 5-40 mL, 5-40 mL, IntraVENous, PRN, Von Stinson, DO    0.9 % sodium chloride infusion, , IntraVENous, PRN, Von Stinson, DO, New Bag at 06/12/24 1123    potassium chloride (KLOR-CON M) extended release tablet 40 mEq, 40 mEq, Oral, PRN **OR** potassium bicarb-citric acid (EFFER-K) effervescent tablet 40 mEq, 40 mEq, Oral, PRN **OR** potassium chloride 10 mEq/100 mL IVPB (Peripheral Line), 10 mEq, IntraVENous, PRN, Von Stinson,     magnesium sulfate 2000 mg in 50 mL IVPB premix, 2,000 mg, IntraVENous, PRN, Von Stinson, DO    ondansetron (ZOFRAN-ODT) disintegrating tablet 4 mg, 4 mg, Oral, Q8H PRN **OR** ondansetron (ZOFRAN) injection 4 mg, 4 mg, IntraVENous, Q6H PRN, Von Stinson, DO    polyethylene glycol (GLYCOLAX) packet 17 g, 17 g, Oral, Daily PRN, Von Stinson,     acetaminophen (TYLENOL) tablet 650 mg, 650 mg, Oral, Q6H PRN **OR** acetaminophen (TYLENOL) suppository 650 mg, 650 mg, Rectal, Q6H PRN, oVn Stinson, DO    HYDROcodone-acetaminophen (NORCO) 5-325 MG per tablet 1 tablet, 1 tablet, Oral, Q6H PRN, Von Stinson, DO, 1 tablet at 06/13/24 0918    morphine (PF) injection 2 mg, 2 mg, IntraVENous, Q4H PRN, oVn Stinson,     Objective:    /63   Pulse 90   Temp 97.7 °F (36.5 °C) (Temporal)   Resp 18   Ht 1.524 m (5')   Wt 61.8 kg (136 lb 3.9 oz)   SpO2 93%   BMI 26.61 kg/m²     Heart:  reg  Lungs:  ctab  Abd: + bs soft nontender  Extrem:  min edema legs    CBC with Differential:    Lab Results   Component Value Date/Time    WBC 27.4 06/13/2024 07:34

## 2024-06-14 VITALS
DIASTOLIC BLOOD PRESSURE: 80 MMHG | HEART RATE: 92 BPM | BODY MASS INDEX: 26.75 KG/M2 | TEMPERATURE: 98.6 F | WEIGHT: 136.24 LBS | RESPIRATION RATE: 18 BRPM | HEIGHT: 60 IN | SYSTOLIC BLOOD PRESSURE: 134 MMHG | OXYGEN SATURATION: 92 %

## 2024-06-14 PROCEDURE — 6370000000 HC RX 637 (ALT 250 FOR IP): Performed by: PHYSICAL THERAPY ASSISTANT

## 2024-06-14 PROCEDURE — 2580000003 HC RX 258: Performed by: PHYSICAL THERAPY ASSISTANT

## 2024-06-14 RX ORDER — OXYCODONE HYDROCHLORIDE AND ACETAMINOPHEN 5; 325 MG/1; MG/1
1 TABLET ORAL EVERY 6 HOURS PRN
Qty: 28 TABLET | Refills: 0 | Status: SHIPPED | OUTPATIENT
Start: 2024-06-14 | End: 2024-06-21

## 2024-06-14 RX ADMIN — HYDROCODONE BITARTRATE AND ACETAMINOPHEN 1 TABLET: 5; 325 TABLET ORAL at 07:21

## 2024-06-14 RX ADMIN — ASPIRIN 81 MG 81 MG: 81 TABLET ORAL at 08:10

## 2024-06-14 RX ADMIN — SODIUM CHLORIDE, PRESERVATIVE FREE 10 ML: 5 INJECTION INTRAVENOUS at 08:11

## 2024-06-14 ASSESSMENT — PAIN SCALES - GENERAL
PAINLEVEL_OUTOF10: 3
PAINLEVEL_OUTOF10: 7
PAINLEVEL_OUTOF10: 5

## 2024-06-14 ASSESSMENT — PAIN DESCRIPTION - PAIN TYPE: TYPE: SURGICAL PAIN

## 2024-06-14 ASSESSMENT — PAIN DESCRIPTION - LOCATION
LOCATION: HIP
LOCATION: HIP;LEG

## 2024-06-14 ASSESSMENT — PAIN DESCRIPTION - DESCRIPTORS
DESCRIPTORS: ACHING;DISCOMFORT;SORE
DESCRIPTORS: ACHING;DISCOMFORT;SORE

## 2024-06-14 ASSESSMENT — PAIN - FUNCTIONAL ASSESSMENT: PAIN_FUNCTIONAL_ASSESSMENT: PREVENTS OR INTERFERES SOME ACTIVE ACTIVITIES AND ADLS

## 2024-06-14 ASSESSMENT — PAIN DESCRIPTION - DIRECTION: RADIATING_TOWARDS: FOOT

## 2024-06-14 ASSESSMENT — PAIN DESCRIPTION - ORIENTATION
ORIENTATION: RIGHT
ORIENTATION: RIGHT

## 2024-06-14 NOTE — PLAN OF CARE
Problem: Discharge Planning  Goal: Discharge to home or other facility with appropriate resources  6/13/2024 2133 by Jessica Bassett RN  Outcome: Progressing  Flowsheets (Taken 6/13/2024 2045)  Discharge to home or other facility with appropriate resources: Identify barriers to discharge with patient and caregiver  6/13/2024 1827 by Rebeca Givens RN  Outcome: Progressing     Problem: Pain  Goal: Verbalizes/displays adequate comfort level or baseline comfort level  6/13/2024 2133 by Jessica Bassett RN  Outcome: Progressing  6/13/2024 1827 by Rebeca Givens RN  Outcome: Progressing     Problem: Skin/Tissue Integrity  Goal: Absence of new skin breakdown  Description: 1.  Monitor for areas of redness and/or skin breakdown  2.  Assess vascular access sites hourly  3.  Every 4-6 hours minimum:  Change oxygen saturation probe site  4.  Every 4-6 hours:  If on nasal continuous positive airway pressure, respiratory therapy assess nares and determine need for appliance change or resting period.  6/13/2024 2133 by Jessica Bassett RN  Outcome: Progressing  6/13/2024 1827 by Rebeca Givens RN  Outcome: Progressing     Problem: ABCDS Injury Assessment  Goal: Absence of physical injury  Outcome: Progressing  Flowsheets (Taken 6/13/2024 2045)  Absence of Physical Injury: Implement safety measures based on patient assessment     Problem: Safety - Adult  Goal: Free from fall injury  Outcome: Progressing  Flowsheets (Taken 6/13/2024 2045)  Free From Fall Injury: Instruct family/caregiver on patient safety     Problem: Musculoskeletal - Adult  Goal: Return mobility to safest level of function  Outcome: Progressing  Goal: Maintain proper alignment of affected body part  Outcome: Progressing  Goal: Return ADL status to a safe level of function  Outcome: Progressing     Problem: Genitourinary - Adult  Goal: Absence of urinary retention  Outcome: Progressing

## 2024-06-14 NOTE — PROGRESS NOTES
[facility] Medicine    Subjective:  pt alert conversive hard of hearing pt sees dr clayton for CLL      Current Facility-Administered Medications:     aspirin chewable tablet 81 mg, 81 mg, Oral, BID, Von Stinson, DO, 81 mg at 06/14/24 0810    sodium chloride flush 0.9 % injection 5-40 mL, 5-40 mL, IntraVENous, 2 times per day, Von Stinson, DO, 10 mL at 06/14/24 0811    sodium chloride flush 0.9 % injection 5-40 mL, 5-40 mL, IntraVENous, PRN, Von Stinson, DO    0.9 % sodium chloride infusion, , IntraVENous, PRN, Von Stinson, DO, New Bag at 06/12/24 1123    potassium chloride (KLOR-CON M) extended release tablet 40 mEq, 40 mEq, Oral, PRN **OR** potassium bicarb-citric acid (EFFER-K) effervescent tablet 40 mEq, 40 mEq, Oral, PRN **OR** potassium chloride 10 mEq/100 mL IVPB (Peripheral Line), 10 mEq, IntraVENous, PRN, Von Stinson, DO    magnesium sulfate 2000 mg in 50 mL IVPB premix, 2,000 mg, IntraVENous, PRN, Von Stinson L, DO    ondansetron (ZOFRAN-ODT) disintegrating tablet 4 mg, 4 mg, Oral, Q8H PRN **OR** ondansetron (ZOFRAN) injection 4 mg, 4 mg, IntraVENous, Q6H PRN, Von Stinson, DO    polyethylene glycol (GLYCOLAX) packet 17 g, 17 g, Oral, Daily PRN, Von Stinson, DO    acetaminophen (TYLENOL) tablet 650 mg, 650 mg, Oral, Q6H PRN **OR** acetaminophen (TYLENOL) suppository 650 mg, 650 mg, Rectal, Q6H PRN, Von Stinson, DO    HYDROcodone-acetaminophen (NORCO) 5-325 MG per tablet 1 tablet, 1 tablet, Oral, Q6H PRN, Von Stinson, DO, 1 tablet at 06/14/24 0721    morphine (PF) injection 2 mg, 2 mg, IntraVENous, Q4H PRN, Von Stinson, DO    Objective:    /80   Pulse 92   Temp 98.6 °F (37 °C) (Temporal)   Resp 18   Ht 1.524 m (5')   Wt 61.8 kg (136 lb 3.9 oz)   SpO2 92%   BMI 26.61 kg/m²     Heart:  reg  Lungs:  ctab  Abd: +bs soft nontender  Extrem:  w/o edema    CBC with Differential:    Lab Results   Component Value Date/Time    WBC 27.4 06/13/2024 07:34 AM     RBC 4.05 06/13/2024 07:34 AM    HGB 13.0 06/13/2024 07:34 AM    HCT 38.5 06/13/2024 07:34 AM     06/13/2024 07:34 AM    MCV 95.1 06/13/2024 07:34 AM    MCH 32.1 06/13/2024 07:34 AM    MCHC 33.8 06/13/2024 07:34 AM    RDW 13.2 06/13/2024 07:34 AM    METASPCT 3 01/12/2024 04:15 AM    LYMPHOPCT 45 06/11/2024 11:46 AM    MONOPCT 3 06/11/2024 11:46 AM    EOSPCT 0 06/11/2024 11:46 AM    BASOPCT 0 06/11/2024 11:46 AM    MONOSABS 0.50 06/11/2024 11:46 AM    EOSABS 0.00 06/11/2024 11:46 AM    BASOSABS 0.00 06/11/2024 11:46 AM     CMP:    Lab Results   Component Value Date/Time     06/13/2024 07:34 AM    K 4.4 06/13/2024 07:34 AM     06/13/2024 07:34 AM    CO2 17 06/13/2024 07:34 AM    BUN 28 06/13/2024 07:34 AM    CREATININE 1.0 06/13/2024 07:34 AM    LABGLOM 56 06/13/2024 07:34 AM    LABGLOM >60 01/12/2024 04:15 AM    GLUCOSE 130 06/13/2024 07:34 AM    CALCIUM 8.9 06/13/2024 07:34 AM    BILITOT 0.4 06/11/2024 11:46 AM    ALKPHOS 86 06/11/2024 11:46 AM    AST 25 06/11/2024 11:46 AM    ALT 18 06/11/2024 11:46 AM     Warfarin PT/INR:    Lab Results   Component Value Date    INR 1.1 06/11/2024    PROTIME 11.4 06/11/2024       Assessment:    Principal Problem:    Closed fracture of neck of right femur, initial encounter ([facility])  Resolved Problems:    * No resolved hospital problems. *  CLL    Plan:  Dc to rehab no need for hematology consult        Osvaldo Craig DO  9:13 AM  6/14/2024

## 2024-06-14 NOTE — CARE COORDINATION
6/14/2024Social work transition of care planning  Facility  to transport at noon today. Charge and dtr notified.  Electronically signed by EDGAR Gudino on 6/14/2024 at 9:48 AM

## 2024-06-14 NOTE — PROGRESS NOTES
Nurse to Nurse called to Dilma at [facility]. All questions answered and paperwork faxed to number provided by Dilma, [phone].

## 2024-06-24 NOTE — PROGRESS NOTES
Physician Progress Note      PATIENT:               STACY CRAWFORD  CSN #:                  [id number]  :                       1933  ADMIT DATE:       2024 11:19 AM  DISCH DATE:        2024 1:26 PM  RESPONDING  PROVIDER #:        Osvaldo Marsh DO          QUERY TEXT:    Pt admitted with  Right femoral neck fracture.  Noted documentation of Right   femoral neck fracture pathologic due to underlying osteoporosis in Orthopedic   Surgery consultant in .  If possible, please document in progress notes   and discharge summary:    The medical record reflects the following:  Risk Factors: Fall, Age, Osteoporosis    Clinical Indicators: ED  \"mechanical fall with right hip pain.  Patient   was buying a lottery ticket when she tripped and fell. H&P   Right femoral   neck fracture\".    Orthopedic Surgery consult  Right femoral neck fracture pathologic due to   underlying osteoporosis. S/P right hip hemiarthroplasty .    Treatment: Orthopedic Surgery consult, Calcium with vitamin D tablets, S/P   right hip hemiarthroplasty    Thank you  BRITTANY Tyson CDS  Options provided:  -- Right femoral neck fracture pathologic due to underlying osteoporosis   confirmed present on admission  -- Right femoral neck fracture pathologic due to underlying osteoporosis ruled   out  -- Other - I will add my own diagnosis  -- Disagree - Not applicable / Not valid  -- Disagree - Clinically unable to determine / Unknown  -- Refer to Clinical Documentation Reviewer    PROVIDER RESPONSE TEXT:    The diagnosis of Right femoral neck fracture pathologic due to underlying   osteoporosis confirmed as present on admission.    Query created by: Jamari Stoll on 2024 3:33 AM      Electronically signed by:  Osvaldo Marsh DO 2024 6:24 PM

## 2024-06-28 DIAGNOSIS — S72.001A CLOSED FRACTURE OF NECK OF RIGHT FEMUR, INITIAL ENCOUNTER (HCC): Primary | ICD-10-CM

## 2024-07-01 ENCOUNTER — OFFICE VISIT (OUTPATIENT)
Dept: ORTHOPEDIC SURGERY | Age: 89
End: 2024-07-01
Payer: MEDICARE

## 2024-07-01 ENCOUNTER — HOSPITAL ENCOUNTER (OUTPATIENT)
Dept: GENERAL RADIOLOGY | Age: 89
Discharge: HOME | End: 2024-07-03
Payer: MEDICARE

## 2024-07-01 VITALS — HEIGHT: 60 IN | WEIGHT: 136.24 LBS | BODY MASS INDEX: 26.75 KG/M2

## 2024-07-01 DIAGNOSIS — Z96.641 HISTORY OF RIGHT HIP HEMIARTHROPLASTY: Primary | ICD-10-CM

## 2024-07-01 DIAGNOSIS — S72.001A CLOSED FRACTURE OF NECK OF RIGHT FEMUR, INITIAL ENCOUNTER (HCC): ICD-10-CM

## 2024-07-01 PROCEDURE — 99024 POSTOP FOLLOW-UP VISIT: CPT | Performed by: PHYSICIAN ASSISTANT

## 2024-07-01 PROCEDURE — 99213 OFFICE O/P EST LOW 20 MIN: CPT

## 2024-07-01 PROCEDURE — 73502 X-RAY EXAM HIP UNI 2-3 VIEWS: CPT

## 2024-07-01 NOTE — PROGRESS NOTES
Chief Complaint   Patient presents with    Follow-up     Patient here for 2wk p/o Right Hip Hemiarthroplasty 6/12/24; J[facility] (*GP 9/10/24) Patient states pain not bad            OP:SURGEON: Dr. Justen Villalobos DO  DATE OF PROCEDURE: 6-12-24  PROCEDURE: RIGHT HIP HEMIARTHROPLASTY     POD: 2 weeks    Subjective:  Rosa Isela Weaver is following up from the above surgery. She is WBAT on right lower extremity. She ambulates with assistive device, walker.  Pain to extremity is none and is not taking prescribed pain medication. They denies numbness or tingling to the right lower extremity. Denies calf pain, chest pain, or shortness of breath.  Patient continues to use DVT prophylaxis, ASA 81 mg BID. Patient is not participating in therapy at this time.  She was just discharged from the nursing facility and is set up to start home therapy.  She presents with her family today.  Patient is doing well, denies any pain.     Review of Systems -  All pertinent positives/negative in HPI     Objective:    General: Alert and oriented X 3, normocephalic atraumatic, external ears and eye normal, sclera clear, no acute distress, respirations easy and unlabored with no audible wheezes, skin warm and dry, speech and dress appropriate for noted age, affect euthymic.    Extremity:  Right Lower Extremity  Skin clean dry and intact, without signs of infection   Incision well-healed, sutures removed and Steri-Strips applied  no edema noted  Compartments supple throughout thigh and leg  Calf supple and not tender  negative Homans  Demonstrates active motion with HF, knee flexion/extension and ankle DF/PF  Ambulates quite well with a walker  States sensation intact to touch in sural, deep peroneal, superficial peroneal, saphenous, posterior tibial  nerve distributions to foot/ankle.  Palpable dorsalis pedis and posterior tibialis pulses, cap refill brisk in toes, foot warm/perfused.    Ht 1.524 m (5')   Wt 61.8 kg (136 lb 3.9 oz)   BMI 26.61

## 2024-07-01 NOTE — PATIENT INSTRUCTIONS
Weightbearing as tolerated right lower extremity.    Continue aspirin for DVT prophylaxis until 28 days postop.    If Steri-Strips do not fall off right hip incision in 7 days on their own, okay to remove.    Patient is set up to start home therapy.    Follow-up in 4-6 weeks for reevaluation and x-rays.    Call if any questions or concerns.

## 2024-07-26 DIAGNOSIS — Z96.641 HISTORY OF RIGHT HIP HEMIARTHROPLASTY: Primary | ICD-10-CM

## 2024-07-29 ENCOUNTER — HOSPITAL ENCOUNTER (OUTPATIENT)
Dept: GENERAL RADIOLOGY | Age: 89
Discharge: HOME | End: 2024-07-31
Payer: MEDICARE

## 2024-07-29 ENCOUNTER — OFFICE VISIT (OUTPATIENT)
Dept: ORTHOPEDIC SURGERY | Age: 89
End: 2024-07-29
Payer: MEDICARE

## 2024-07-29 DIAGNOSIS — Z96.641 HISTORY OF RIGHT HIP HEMIARTHROPLASTY: ICD-10-CM

## 2024-07-29 DIAGNOSIS — Z96.641 HISTORY OF RIGHT HIP HEMIARTHROPLASTY: Primary | ICD-10-CM

## 2024-07-29 PROCEDURE — 99024 POSTOP FOLLOW-UP VISIT: CPT | Performed by: PHYSICIAN ASSISTANT

## 2024-07-29 PROCEDURE — 73502 X-RAY EXAM HIP UNI 2-3 VIEWS: CPT

## 2024-07-29 PROCEDURE — 99212 OFFICE O/P EST SF 10 MIN: CPT

## 2024-07-29 NOTE — PATIENT INSTRUCTIONS
Weightbearing as tolerated right lower extremity.    Follow-up as needed.    Call if any questions or concerns.

## 2024-07-29 NOTE — PROGRESS NOTES
Chief Complaint   Patient presents with    Post-Op Check     Six week po RT hip hemiarthroplasty 6/12/24.  Pt up with w/w ambulating, does use walker at home all of the time.  Pt hard of hearing.          OP:SURGEON: Dr. Justen Villalobos DO  DATE OF PROCEDURE: 6-12-24  PROCEDURE: RIGHT HIP HEMIARTHROPLASTY     POD: 6.5 weeks    Subjective:  Ros aIsela Weaver is following up from the above surgery. She is WBAT on right lower extremity. She ambulates with assistive device, walker.  Pain to extremity is none and is not taking prescribed pain medication. They denies numbness or tingling to the right lower extremity. Denies calf pain, chest pain, or shortness of breath.  Patient has finished DVT prophylaxis. Patient is not participating in therapy at this time.  She is doing well after surgery.  Denies any pain or problems with the hip.  States that she is getting around ambulating well with a walker.     Review of Systems -  All pertinent positives/negative in HPI     Objective:    General: Alert and oriented X 3, normocephalic atraumatic, external ears and eye normal, sclera clear, no acute distress, respirations easy and unlabored with no audible wheezes, skin warm and dry, speech and dress appropriate for noted age, affect euthymic.    Extremity:  Right Lower Extremity  Skin clean dry and intact, without signs of infection   Incision well-healed  no edema noted  Compartments supple throughout thigh and leg  Calf supple and not tender  negative Homans  Demonstrates active motion with HF, knee flexion/extension and ankle DF/PF  Patient ambulates well with a walker  States sensation intact to touch in sural, deep peroneal, superficial peroneal, saphenous, posterior tibial  nerve distributions to foot/ankle.  Palpable dorsalis pedis and posterior tibialis pulses, cap refill brisk in toes, foot warm/perfused.    There were no vitals taken for this visit.    XR:   Pelvis and right hip films demonstrate s/p right hip

## (undated) DEVICE — 3M™ IOBAN™ 2 ANTIMICROBIAL INCISE DRAPE 6650EZ: Brand: IOBAN™ 2

## (undated) DEVICE — STRYKER PERFORMANCE SERIES SAGITTAL BLADE: Brand: STRYKER PERFORMANCE SERIES

## (undated) DEVICE — SOLUTION IRRIG 3000ML 0.9% SOD CHL USP UROMATIC PLAS CONT

## (undated) DEVICE — DRESSING,GAUZE,XEROFORM,CURAD,5"X9",ST: Brand: CURAD

## (undated) DEVICE — BIT DRL L5IN DIA2.8MM STD ST S STL TWST BUSA

## (undated) DEVICE — NEEDLE HYPO 21GA L1.5IN GRN POLYPR HUB S STL REG BVL STR

## (undated) DEVICE — APPLICATOR MEDICATED 26 CC SOLUTION HI LT ORNG CHLORAPREP

## (undated) DEVICE — BLADE CLIPPER GEN PURP NS

## (undated) DEVICE — ELECTRODE PT RET AD L9FT HI MOIST COND ADH HYDRGEL CORDED

## (undated) DEVICE — SYRINGE MED 20ML STD CLR PLAS LUERLOCK TIP N CTRL DISP